# Patient Record
Sex: FEMALE | Race: BLACK OR AFRICAN AMERICAN | Employment: FULL TIME | URBAN - METROPOLITAN AREA
[De-identification: names, ages, dates, MRNs, and addresses within clinical notes are randomized per-mention and may not be internally consistent; named-entity substitution may affect disease eponyms.]

---

## 2020-03-30 ENCOUNTER — OP HISTORICAL/CONVERTED ENCOUNTER (OUTPATIENT)
Dept: OTHER | Age: 45
End: 2020-03-30

## 2020-07-02 ENCOUNTER — HOSPITAL ENCOUNTER (OUTPATIENT)
Dept: PREADMISSION TESTING | Age: 45
Discharge: HOME | End: 2020-07-02
Payer: COMMERCIAL

## 2020-07-02 VITALS
BODY MASS INDEX: 27.97 KG/M2 | OXYGEN SATURATION: 99 % | TEMPERATURE: 98.8 F | DIASTOLIC BLOOD PRESSURE: 69 MMHG | HEIGHT: 66 IN | SYSTOLIC BLOOD PRESSURE: 121 MMHG | HEART RATE: 73 BPM | WEIGHT: 174 LBS | RESPIRATION RATE: 20 BRPM

## 2020-07-02 PROCEDURE — 86923 COMPATIBILITY TEST ELECTRIC: CPT

## 2020-07-02 PROCEDURE — 86900 BLOOD TYPING SEROLOGIC ABO: CPT

## 2020-07-02 PROCEDURE — 36415 COLL VENOUS BLD VENIPUNCTURE: CPT

## 2020-07-02 RX ORDER — NORETHINDRONE 5 MG/1
5 TABLET ORAL DAILY
COMMUNITY
End: 2020-07-10

## 2020-07-02 NOTE — PERIOP NOTES
[address], [address] [facility]   MAIN OR                                  [phone]   MAIN PRE OP                          [phone]                                                                                AMBULATORY PRE OP          [phone]  PRE-ADMISSION TESTING    [phone]   Surgery Date:  7/9/20       Is surgery arrival time given by surgeon? YES   If NO, [facility] you between 3 and 7pm the day before your surgery with your arrival time. (If your surgery is on a Monday, we will call you the Friday before.)    Call [phone] after 7pm Monday-Friday if you did not receive this call. INSTRUCTIONS BEFORE YOUR SURGERY   When You  Arrive Arrive at the [address] [facility] on the day of your surgery  Have your insurance card, photo ID, and any copayment (if needed)   Food   and   Drink NO food or drink after midnight the night before surgery    This means NO water, gum, mints, coffee, juice, etc.  No alcohol (beer, wine, liquor) 24 hours before and after surgery   Medications to   TAKE   Morning of Surgery MEDICATIONS TO TAKE THE MORNING OF SURGERY WITH A SIP OF WATER:    none   Medications  To  STOP      7 days before surgery  Non-Steroidal anti-inflammatory Drugs (NSAID's): for example, Ibuprofen (Advil, Motrin), Naproxen (Aleve)   Aspirin, if taking for pain    Herbal supplements, vitamins, and fish oil   Other:  (Pain medications not listed above, including Tylenol may be taken)   Blood  Thinners  If you take  Aspirin, Plavix, Coumadin, or any blood-thinning or anti-blood clot medicine, talk to the doctor who prescribed the medications for pre-operative instructions.    Bathing Clothing  Jewelry  Valuables      If you shower the morning of surgery, please do not apply anything to your skin (lotions, powders, deodorant, or makeup, especially mascara)   Follow Chlorhexidine [employer] Fusion body wash instructions provided to you during PAT appointment. Begin 3 days prior to surgery.  Do not shave or trim anywhere 24 hours before surgery   Wear your hair loose or down; no pony-tails, buns, or metal hair clips   Wear loose, comfortable, clean clothes   Wear glasses instead of contacts   Leave money, valuables, and jewelry, including body piercings, at home   Going Home - or Spending the Night  SAME-DAY SURGERY: You must have a responsible adult drive you home and stay with you 24 hours after surgery   ADMITS: If your doctor is keeping you in the hospital after surgery, leave personal belongings/luggage in your car until you have a hospital room number. Hospital discharge time is 12 noon  Drivers must be here before 12 noon unless you are told differently   Special Instructions It is now mandated that all surgical patients be tested for COVID-19 prior to surgery. Testing has to be exactly 4 days prior to surgery. Your COVID test date is 7/5/2020 between 8:00 am and 11:00 am.       COVID testing will be performed curbside at the [facility]  [place]. There will be signs leading you to the testing site. You will need to bring a photo ID with you to be swabbed. Patients are advised to self-quarantine at home after testing and prior to your surgery date. You will be notified if your results are positive.     What to watch for:   Coronavirus (COVID-19) affects different people in different ways   It also appears with a wide range of symptoms from mild to severe   Signs usually appear 2-14 days after exposure     If you develop any of the following, notify your doctor immediately:  o Fever  o Chills, with or without a shiver  o Muscle pain  o Headache  o Sore throat  o Dry cough  o New loss of taste or smell  o Tiredness      If you develop any of the following, call 911:  o Shortness of breath  o Difficulty breathing  o Chest pain  o New confusion  o Blueness of fingers and/or lips     Follow all instructions so your surgery wont be cancelled. Please, be on time. If a situation occurs and you are delayed the day of surgery, call  [phone]. If your physical condition changes (like a fever, cold, flu, etc.) call your surgeon. Home medication(s) reviewed and verified via   bottles  during PAT appointment. The patient was contacted by     IN-PERSON  The patient verbalizes understanding of all instructions and     DOES NOT   need reinforcement.

## 2020-07-05 ENCOUNTER — HOSPITAL ENCOUNTER (OUTPATIENT)
Dept: PREADMISSION TESTING | Age: 45
Discharge: HOME | End: 2020-07-05
Payer: COMMERCIAL

## 2020-07-05 PROCEDURE — 87635 SARS-COV-2 COVID-19 AMP PRB: CPT

## 2020-07-06 LAB — SARS-COV-2, COV2NT: NOT DETECTED

## 2020-07-08 ENCOUNTER — ANESTHESIA EVENT (OUTPATIENT)
Dept: SURGERY | Age: 45
End: 2020-07-08
Payer: COMMERCIAL

## 2020-07-08 RX ORDER — SODIUM CHLORIDE 9 MG/ML
250 INJECTION, SOLUTION INTRAVENOUS AS NEEDED
Status: ACTIVE | OUTPATIENT
Start: 2020-07-08

## 2020-07-08 NOTE — PERIOP NOTES
Surgical orders received and placed on the paper chart. Labs and an EKG will need to be obtained on the day of surgery. Dr. Vega Pac has ordered for [employer] for 2 units of PC's.   Placed an order in [address] [place] [address] and called the blood bank requesting that the T&S obtained while the patient was in PAT be changed to a T&C.  DOS: 7/9/2020

## 2020-07-09 ENCOUNTER — HOSPITAL ENCOUNTER (OUTPATIENT)
Age: 45
Setting detail: OBSERVATION
Discharge: HOME | End: 2020-07-10
Attending: OBSTETRICS & GYNECOLOGY | Admitting: OBSTETRICS & GYNECOLOGY
Payer: COMMERCIAL

## 2020-07-09 ENCOUNTER — ANESTHESIA (OUTPATIENT)
Dept: SURGERY | Age: 45
End: 2020-07-09
Payer: COMMERCIAL

## 2020-07-09 DIAGNOSIS — Z90.710 STATUS POST LAPAROSCOPIC HYSTERECTOMY: Primary | ICD-10-CM

## 2020-07-09 LAB
BASOPHILS # BLD: 0.1 K/UL (ref 0–0.1)
BASOPHILS NFR BLD: 1 % (ref 0–1)
DIFFERENTIAL METHOD BLD: ABNORMAL
EOSINOPHIL # BLD: 0.2 K/UL (ref 0–0.4)
EOSINOPHIL NFR BLD: 2 % (ref 0–7)
ERYTHROCYTE [DISTWIDTH] IN BLOOD BY AUTOMATED COUNT: 18.8 % (ref 11.5–14.5)
HCG UR QL: NEGATIVE
HCT VFR BLD AUTO: 38.3 % (ref 35–47)
HGB BLD-MCNC: 11.5 G/DL (ref 11.5–16)
IMM GRANULOCYTES # BLD AUTO: 0 K/UL (ref 0–0.04)
IMM GRANULOCYTES NFR BLD AUTO: 0 % (ref 0–0.5)
LYMPHOCYTES # BLD: 2.4 K/UL (ref 0.8–3.5)
LYMPHOCYTES NFR BLD: 33 % (ref 12–49)
MCH RBC QN AUTO: 23.4 PG (ref 26–34)
MCHC RBC AUTO-ENTMCNC: 30 G/DL (ref 30–36.5)
MCV RBC AUTO: 78 FL (ref 80–99)
MONOCYTES # BLD: 0.6 K/UL (ref 0–1)
MONOCYTES NFR BLD: 9 % (ref 5–13)
NEUTS SEG # BLD: 4.1 K/UL (ref 1.8–8)
NEUTS SEG NFR BLD: 56 % (ref 32–75)
NRBC # BLD: 0 K/UL (ref 0–0.01)
NRBC BLD-RTO: 0 PER 100 WBC
PLATELET # BLD AUTO: 309 K/UL (ref 150–400)
PMV BLD AUTO: 11.7 FL (ref 8.9–12.9)
RBC # BLD AUTO: 4.91 M/UL (ref 3.8–5.2)
WBC # BLD AUTO: 7.3 K/UL (ref 3.6–11)

## 2020-07-09 PROCEDURE — 77030005513 HC CATH URETH FOL11 MDII -B: Performed by: OBSTETRICS & GYNECOLOGY

## 2020-07-09 PROCEDURE — 77030018778 HC MANIP UTER VCAR CNMD -B: Performed by: OBSTETRICS & GYNECOLOGY

## 2020-07-09 PROCEDURE — 85025 COMPLETE CBC W/AUTO DIFF WBC: CPT

## 2020-07-09 PROCEDURE — 36415 COLL VENOUS BLD VENIPUNCTURE: CPT

## 2020-07-09 PROCEDURE — 76030000003 HC AMB SURG OR TIME 1.5 TO 2: Performed by: OBSTETRICS & GYNECOLOGY

## 2020-07-09 PROCEDURE — 88307 TISSUE EXAM BY PATHOLOGIST: CPT

## 2020-07-09 PROCEDURE — 77030011640 HC PAD GRND REM COVD -A: Performed by: OBSTETRICS & GYNECOLOGY

## 2020-07-09 PROCEDURE — C1765 ADHESION BARRIER: HCPCS | Performed by: OBSTETRICS & GYNECOLOGY

## 2020-07-09 PROCEDURE — 77030034154 HC SHR COAG HARM ACE J&J -F: Performed by: OBSTETRICS & GYNECOLOGY

## 2020-07-09 PROCEDURE — 77030027138 HC INCENT SPIROMETER -A

## 2020-07-09 PROCEDURE — 74011250636 HC RX REV CODE- 250/636: Performed by: ANESTHESIOLOGY

## 2020-07-09 PROCEDURE — 77030020829: Performed by: OBSTETRICS & GYNECOLOGY

## 2020-07-09 PROCEDURE — 77030037032 HC INSRT SCIS CLICKLLINE DISP STOR -B: Performed by: OBSTETRICS & GYNECOLOGY

## 2020-07-09 PROCEDURE — 77030008756 HC TU IRR SUC STRY -B: Performed by: OBSTETRICS & GYNECOLOGY

## 2020-07-09 PROCEDURE — 77030010507 HC ADH SKN DERMBND J&J -B: Performed by: OBSTETRICS & GYNECOLOGY

## 2020-07-09 PROCEDURE — 74011000250 HC RX REV CODE- 250: Performed by: OBSTETRICS & GYNECOLOGY

## 2020-07-09 PROCEDURE — 77030020053 HC ELECTRD LAPSCP COVD -B: Performed by: OBSTETRICS & GYNECOLOGY

## 2020-07-09 PROCEDURE — 99218 HC RM OBSERVATION: CPT

## 2020-07-09 PROCEDURE — 77030018836 HC SOL IRR NACL ICUM -A: Performed by: OBSTETRICS & GYNECOLOGY

## 2020-07-09 PROCEDURE — 76060000063 HC AMB SURG ANES 1.5 TO 2 HR: Performed by: OBSTETRICS & GYNECOLOGY

## 2020-07-09 PROCEDURE — 77030009848 HC PASSR SUT SET COOP -C: Performed by: OBSTETRICS & GYNECOLOGY

## 2020-07-09 PROCEDURE — 77030035029 HC NDL INSUF VERES DISP COVD -B: Performed by: OBSTETRICS & GYNECOLOGY

## 2020-07-09 PROCEDURE — 76210000037 HC AMBSU PH I REC 2 TO 2.5 HR: Performed by: OBSTETRICS & GYNECOLOGY

## 2020-07-09 PROCEDURE — 74011250636 HC RX REV CODE- 250/636: Performed by: OBSTETRICS & GYNECOLOGY

## 2020-07-09 PROCEDURE — 77030040361 HC SLV COMPR DVT MDII -B

## 2020-07-09 PROCEDURE — 77030026438 HC STYL ET INTUB CARD -A: Performed by: ANESTHESIOLOGY

## 2020-07-09 PROCEDURE — 77030012770 HC TRCR OPT FX AMR -B: Performed by: OBSTETRICS & GYNECOLOGY

## 2020-07-09 PROCEDURE — 77030008684 HC TU ET CUF COVD -B: Performed by: ANESTHESIOLOGY

## 2020-07-09 PROCEDURE — 77030031139 HC SUT VCRL2 J&J -A: Performed by: OBSTETRICS & GYNECOLOGY

## 2020-07-09 PROCEDURE — 74011000250 HC RX REV CODE- 250: Performed by: NURSE ANESTHETIST, CERTIFIED REGISTERED

## 2020-07-09 PROCEDURE — 74011250637 HC RX REV CODE- 250/637: Performed by: OBSTETRICS & GYNECOLOGY

## 2020-07-09 PROCEDURE — 77030019908 HC STETH ESOPH SIMS -A: Performed by: ANESTHESIOLOGY

## 2020-07-09 PROCEDURE — 77030040922 HC BLNKT HYPOTHRM STRY -A

## 2020-07-09 PROCEDURE — 77030034628 HC LIGASURE LAP SEAL DIV MD COVD -F: Performed by: OBSTETRICS & GYNECOLOGY

## 2020-07-09 PROCEDURE — 74011000258 HC RX REV CODE- 258: Performed by: OBSTETRICS & GYNECOLOGY

## 2020-07-09 PROCEDURE — 77030020263 HC SOL INJ SOD CL0.9% LFCR 1000ML: Performed by: OBSTETRICS & GYNECOLOGY

## 2020-07-09 PROCEDURE — 74011250636 HC RX REV CODE- 250/636: Performed by: NURSE ANESTHETIST, CERTIFIED REGISTERED

## 2020-07-09 PROCEDURE — 77030003029 HC SUT VCRL J&J -B: Performed by: OBSTETRICS & GYNECOLOGY

## 2020-07-09 PROCEDURE — 81025 URINE PREGNANCY TEST: CPT

## 2020-07-09 PROCEDURE — 77030002933 HC SUT MCRYL J&J -A: Performed by: OBSTETRICS & GYNECOLOGY

## 2020-07-09 RX ORDER — DIPHENHYDRAMINE HCL 25 MG
25 CAPSULE ORAL
Status: DISCONTINUED | OUTPATIENT
Start: 2020-07-09 | End: 2020-07-10 | Stop reason: HOSPADM

## 2020-07-09 RX ORDER — SODIUM CHLORIDE 0.9 % (FLUSH) 0.9 %
5-40 SYRINGE (ML) INJECTION AS NEEDED
Status: DISCONTINUED | OUTPATIENT
Start: 2020-07-09 | End: 2020-07-10 | Stop reason: HOSPADM

## 2020-07-09 RX ORDER — PROPOFOL 10 MG/ML
INJECTION, EMULSION INTRAVENOUS AS NEEDED
Status: DISCONTINUED | OUTPATIENT
Start: 2020-07-09 | End: 2020-07-09 | Stop reason: HOSPADM

## 2020-07-09 RX ORDER — BUPIVACAINE HYDROCHLORIDE AND EPINEPHRINE 5; 5 MG/ML; UG/ML
INJECTION, SOLUTION EPIDURAL; INTRACAUDAL; PERINEURAL AS NEEDED
Status: DISCONTINUED | OUTPATIENT
Start: 2020-07-09 | End: 2020-07-09 | Stop reason: HOSPADM

## 2020-07-09 RX ORDER — SUCCINYLCHOLINE CHLORIDE 20 MG/ML
INJECTION INTRAMUSCULAR; INTRAVENOUS AS NEEDED
Status: DISCONTINUED | OUTPATIENT
Start: 2020-07-09 | End: 2020-07-09 | Stop reason: HOSPADM

## 2020-07-09 RX ORDER — SODIUM CHLORIDE, SODIUM LACTATE, POTASSIUM CHLORIDE, CALCIUM CHLORIDE 600; 310; 30; 20 MG/100ML; MG/100ML; MG/100ML; MG/100ML
100 INJECTION, SOLUTION INTRAVENOUS CONTINUOUS
Status: DISCONTINUED | OUTPATIENT
Start: 2020-07-09 | End: 2020-07-09

## 2020-07-09 RX ORDER — SODIUM CHLORIDE, SODIUM LACTATE, POTASSIUM CHLORIDE, CALCIUM CHLORIDE 600; 310; 30; 20 MG/100ML; MG/100ML; MG/100ML; MG/100ML
INJECTION, SOLUTION INTRAVENOUS
Status: DISCONTINUED | OUTPATIENT
Start: 2020-07-09 | End: 2020-07-09 | Stop reason: HOSPADM

## 2020-07-09 RX ORDER — NEOSTIGMINE METHYLSULFATE 1 MG/ML
INJECTION, SOLUTION INTRAVENOUS AS NEEDED
Status: DISCONTINUED | OUTPATIENT
Start: 2020-07-09 | End: 2020-07-09 | Stop reason: HOSPADM

## 2020-07-09 RX ORDER — ROCURONIUM BROMIDE 10 MG/ML
INJECTION, SOLUTION INTRAVENOUS AS NEEDED
Status: DISCONTINUED | OUTPATIENT
Start: 2020-07-09 | End: 2020-07-09 | Stop reason: HOSPADM

## 2020-07-09 RX ORDER — OXYCODONE AND ACETAMINOPHEN 5; 325 MG/1; MG/1
1-2 TABLET ORAL
Status: DISCONTINUED | OUTPATIENT
Start: 2020-07-09 | End: 2020-07-10 | Stop reason: HOSPADM

## 2020-07-09 RX ORDER — LIDOCAINE HYDROCHLORIDE 10 MG/ML
0.1 INJECTION, SOLUTION EPIDURAL; INFILTRATION; INTRACAUDAL; PERINEURAL AS NEEDED
Status: DISCONTINUED | OUTPATIENT
Start: 2020-07-09 | End: 2020-07-09

## 2020-07-09 RX ORDER — METRONIDAZOLE 500 MG/100ML
500 INJECTION, SOLUTION INTRAVENOUS ONCE
Status: COMPLETED | OUTPATIENT
Start: 2020-07-09 | End: 2020-07-09

## 2020-07-09 RX ORDER — KETOROLAC TROMETHAMINE 30 MG/ML
30 INJECTION, SOLUTION INTRAMUSCULAR; INTRAVENOUS
Status: DISCONTINUED | OUTPATIENT
Start: 2020-07-09 | End: 2020-07-10 | Stop reason: HOSPADM

## 2020-07-09 RX ORDER — HYDROMORPHONE HYDROCHLORIDE 1 MG/ML
1 INJECTION, SOLUTION INTRAMUSCULAR; INTRAVENOUS; SUBCUTANEOUS
Status: DISCONTINUED | OUTPATIENT
Start: 2020-07-09 | End: 2020-07-10 | Stop reason: HOSPADM

## 2020-07-09 RX ORDER — ONDANSETRON 2 MG/ML
4 INJECTION INTRAMUSCULAR; INTRAVENOUS
Status: DISCONTINUED | OUTPATIENT
Start: 2020-07-09 | End: 2020-07-10 | Stop reason: HOSPADM

## 2020-07-09 RX ORDER — HYDROMORPHONE HYDROCHLORIDE 2 MG/ML
INJECTION, SOLUTION INTRAMUSCULAR; INTRAVENOUS; SUBCUTANEOUS AS NEEDED
Status: DISCONTINUED | OUTPATIENT
Start: 2020-07-09 | End: 2020-07-09 | Stop reason: HOSPADM

## 2020-07-09 RX ORDER — MIDAZOLAM HYDROCHLORIDE 1 MG/ML
INJECTION, SOLUTION INTRAMUSCULAR; INTRAVENOUS AS NEEDED
Status: DISCONTINUED | OUTPATIENT
Start: 2020-07-09 | End: 2020-07-09 | Stop reason: HOSPADM

## 2020-07-09 RX ORDER — LIDOCAINE HYDROCHLORIDE 20 MG/ML
INJECTION, SOLUTION EPIDURAL; INFILTRATION; INTRACAUDAL; PERINEURAL AS NEEDED
Status: DISCONTINUED | OUTPATIENT
Start: 2020-07-09 | End: 2020-07-09 | Stop reason: HOSPADM

## 2020-07-09 RX ORDER — IBUPROFEN 600 MG/1
600 TABLET ORAL
Status: DISCONTINUED | OUTPATIENT
Start: 2020-07-10 | End: 2020-07-10 | Stop reason: HOSPADM

## 2020-07-09 RX ORDER — GLYCOPYRROLATE 0.2 MG/ML
INJECTION INTRAMUSCULAR; INTRAVENOUS AS NEEDED
Status: DISCONTINUED | OUTPATIENT
Start: 2020-07-09 | End: 2020-07-09 | Stop reason: HOSPADM

## 2020-07-09 RX ORDER — SODIUM CHLORIDE 0.9 % (FLUSH) 0.9 %
5-40 SYRINGE (ML) INJECTION EVERY 8 HOURS
Status: DISCONTINUED | OUTPATIENT
Start: 2020-07-09 | End: 2020-07-10 | Stop reason: HOSPADM

## 2020-07-09 RX ORDER — SODIUM CHLORIDE, SODIUM LACTATE, POTASSIUM CHLORIDE, CALCIUM CHLORIDE 600; 310; 30; 20 MG/100ML; MG/100ML; MG/100ML; MG/100ML
100 INJECTION, SOLUTION INTRAVENOUS CONTINUOUS
Status: DISCONTINUED | OUTPATIENT
Start: 2020-07-09 | End: 2020-07-09 | Stop reason: HOSPADM

## 2020-07-09 RX ORDER — HYDROMORPHONE HYDROCHLORIDE 1 MG/ML
.25-1 INJECTION, SOLUTION INTRAMUSCULAR; INTRAVENOUS; SUBCUTANEOUS
Status: DISCONTINUED | OUTPATIENT
Start: 2020-07-09 | End: 2020-07-09 | Stop reason: HOSPADM

## 2020-07-09 RX ORDER — DOCUSATE SODIUM 100 MG/1
100 CAPSULE, LIQUID FILLED ORAL 2 TIMES DAILY
Status: DISCONTINUED | OUTPATIENT
Start: 2020-07-09 | End: 2020-07-10 | Stop reason: HOSPADM

## 2020-07-09 RX ORDER — ONDANSETRON 2 MG/ML
INJECTION INTRAMUSCULAR; INTRAVENOUS AS NEEDED
Status: DISCONTINUED | OUTPATIENT
Start: 2020-07-09 | End: 2020-07-09 | Stop reason: HOSPADM

## 2020-07-09 RX ORDER — DEXAMETHASONE SODIUM PHOSPHATE 4 MG/ML
INJECTION, SOLUTION INTRA-ARTICULAR; INTRALESIONAL; INTRAMUSCULAR; INTRAVENOUS; SOFT TISSUE AS NEEDED
Status: DISCONTINUED | OUTPATIENT
Start: 2020-07-09 | End: 2020-07-09 | Stop reason: HOSPADM

## 2020-07-09 RX ORDER — VECURONIUM BROMIDE FOR INJECTION 1 MG/ML
INJECTION, POWDER, LYOPHILIZED, FOR SOLUTION INTRAVENOUS AS NEEDED
Status: DISCONTINUED | OUTPATIENT
Start: 2020-07-09 | End: 2020-07-09 | Stop reason: HOSPADM

## 2020-07-09 RX ORDER — NALOXONE HYDROCHLORIDE 0.4 MG/ML
0.4 INJECTION, SOLUTION INTRAMUSCULAR; INTRAVENOUS; SUBCUTANEOUS AS NEEDED
Status: DISCONTINUED | OUTPATIENT
Start: 2020-07-09 | End: 2020-07-10 | Stop reason: HOSPADM

## 2020-07-09 RX ORDER — DEXTROSE MONOHYDRATE AND SODIUM CHLORIDE 5; .9 G/100ML; G/100ML
125 INJECTION, SOLUTION INTRAVENOUS CONTINUOUS
Status: DISCONTINUED | OUTPATIENT
Start: 2020-07-09 | End: 2020-07-10 | Stop reason: HOSPADM

## 2020-07-09 RX ADMIN — GLYCOPYRROLATE 0.4 MG: 0.2 INJECTION, SOLUTION INTRAMUSCULAR; INTRAVENOUS at 12:40

## 2020-07-09 RX ADMIN — VECURONIUM BROMIDE 2 MG: 10 INJECTION, POWDER, LYOPHILIZED, FOR SOLUTION INTRAVENOUS at 12:27

## 2020-07-09 RX ADMIN — OXYCODONE HYDROCHLORIDE AND ACETAMINOPHEN 1 TABLET: 5; 325 TABLET ORAL at 19:59

## 2020-07-09 RX ADMIN — SUCCINYLCHOLINE CHLORIDE 100 MG: 20 INJECTION, SOLUTION INTRAMUSCULAR; INTRAVENOUS; PARENTERAL at 11:04

## 2020-07-09 RX ADMIN — Medication 3 MG: at 12:40

## 2020-07-09 RX ADMIN — DEXTROSE MONOHYDRATE AND SODIUM CHLORIDE 125 ML/HR: 5; .9 INJECTION, SOLUTION INTRAVENOUS at 14:17

## 2020-07-09 RX ADMIN — ONDANSETRON HYDROCHLORIDE 4 MG: 2 SOLUTION INTRAMUSCULAR; INTRAVENOUS at 11:19

## 2020-07-09 RX ADMIN — VECURONIUM BROMIDE 3 MG: 10 INJECTION, POWDER, LYOPHILIZED, FOR SOLUTION INTRAVENOUS at 11:26

## 2020-07-09 RX ADMIN — SODIUM CHLORIDE, SODIUM LACTATE, POTASSIUM CHLORIDE, AND CALCIUM CHLORIDE 100 ML/HR: 600; 310; 30; 20 INJECTION, SOLUTION INTRAVENOUS at 08:54

## 2020-07-09 RX ADMIN — HYDROMORPHONE HYDROCHLORIDE 1 MG: 2 INJECTION INTRAMUSCULAR; INTRAVENOUS; SUBCUTANEOUS at 12:54

## 2020-07-09 RX ADMIN — HYDROMORPHONE HYDROCHLORIDE 0.5 MG: 1 INJECTION, SOLUTION INTRAMUSCULAR; INTRAVENOUS; SUBCUTANEOUS at 13:32

## 2020-07-09 RX ADMIN — PROPOFOL 150 MG: 10 INJECTION, EMULSION INTRAVENOUS at 11:04

## 2020-07-09 RX ADMIN — ROCURONIUM BROMIDE 20 MG: 10 INJECTION, SOLUTION INTRAVENOUS at 11:04

## 2020-07-09 RX ADMIN — DOCUSATE SODIUM 100 MG: 100 CAPSULE, LIQUID FILLED ORAL at 17:54

## 2020-07-09 RX ADMIN — HYDROMORPHONE HYDROCHLORIDE 1 MG: 2 INJECTION INTRAMUSCULAR; INTRAVENOUS; SUBCUTANEOUS at 10:56

## 2020-07-09 RX ADMIN — SODIUM CHLORIDE, POTASSIUM CHLORIDE, SODIUM LACTATE AND CALCIUM CHLORIDE: 600; 310; 30; 20 INJECTION, SOLUTION INTRAVENOUS at 11:07

## 2020-07-09 RX ADMIN — DEXAMETHASONE SODIUM PHOSPHATE 8 MG: 4 INJECTION, SOLUTION INTRAMUSCULAR; INTRAVENOUS at 11:19

## 2020-07-09 RX ADMIN — CEFAZOLIN SODIUM 2 G: 1 POWDER, FOR SOLUTION INTRAMUSCULAR; INTRAVENOUS at 11:13

## 2020-07-09 RX ADMIN — DEXTROSE MONOHYDRATE AND SODIUM CHLORIDE 125 ML/HR: 5; .9 INJECTION, SOLUTION INTRAVENOUS at 20:01

## 2020-07-09 RX ADMIN — SUGAMMADEX 200 MG: 100 INJECTION, SOLUTION INTRAVENOUS at 12:49

## 2020-07-09 RX ADMIN — KETOROLAC TROMETHAMINE 30 MG: 30 INJECTION, SOLUTION INTRAMUSCULAR at 16:22

## 2020-07-09 RX ADMIN — Medication 10 ML: at 16:22

## 2020-07-09 RX ADMIN — MIDAZOLAM HYDROCHLORIDE 2 MG: 2 INJECTION, SOLUTION INTRAMUSCULAR; INTRAVENOUS at 10:56

## 2020-07-09 RX ADMIN — METRONIDAZOLE 500 MG: 500 SOLUTION INTRAVENOUS at 11:14

## 2020-07-09 RX ADMIN — LIDOCAINE HYDROCHLORIDE 60 MG: 20 INJECTION, SOLUTION INTRAVENOUS at 11:04

## 2020-07-09 NOTE — BRIEF OP NOTE
Brief Postoperative Note    Patient: Lucinda Santacruz  YOB: 1975  MRN: [id number]    Date of Procedure: 7/9/2020     Pre-Op Diagnosis: ABNORMAL UTERINE BLEEDING, FIBROIDS AND POLYPS    Post-Op Diagnosis: Same as preoperative diagnosis. Procedure(s):  TOTAL LAPAROSCOPIC HYSTERECTOMY, POSSIBLE OPEN, BILATERAL SALPINGECTOMY    Surgeon(s):  Alveda Claude, MD Rachelle Nodal, MD    Surgical Assistant: None    Anesthesia: General     Estimated Blood Loss (mL): 905RE     Complications: None    Specimens:   ID Type Source Tests Collected by Time Destination   1 : uterus,cervix,and bilateral fallopian tubes Preservative Uterus with Bilateral Fallopian Tubes  Alveda Claude, MD 7/9/2020 1231 Pathology        Implants: * No implants in log *    Drains: *Rivas *    Findings: Enlarged fibroid uterus, normal ovaries and tubes. Final specimen weight 427g.      Electronically Signed by Stacie Barnard MD on 7/9/2020 at 12:47 PM

## 2020-07-09 NOTE — ANESTHESIA POSTPROCEDURE EVALUATION
Procedure(s):  TOTAL LAPAROSCOPIC HYSTERECTOMY, POSSIBLE OPEN, BILATERAL SALPINGECTOMY. general    Anesthesia Post Evaluation      Multimodal analgesia: multimodal analgesia not used between 6 hours prior to anesthesia start to PACU discharge  Patient location during evaluation: PACU  Patient participation: complete - patient participated  Level of consciousness: awake  Pain management: adequate  Airway patency: patent  Anesthetic complications: no  Cardiovascular status: acceptable, blood pressure returned to baseline and hemodynamically stable  Respiratory status: acceptable  Hydration status: acceptable  Post anesthesia nausea and vomiting:  controlled      INITIAL Post-op Vital signs:   Vitals Value Taken Time   /62 7/9/2020  2:30 PM   Temp 36.7 °C (98.1 °F) 7/9/2020  2:00 PM   Pulse 69 7/9/2020  2:55 PM   Resp 17 7/9/2020  2:55 PM   SpO2 100 % 7/9/2020  2:55 PM   Vitals shown include unvalidated device data.

## 2020-07-09 NOTE — ANESTHESIA PREPROCEDURE EVALUATION
Relevant Problems   No relevant active problems       Anesthetic History   No history of anesthetic complications            Review of Systems / Medical History  Patient summary reviewed, nursing notes reviewed and pertinent labs reviewed    Pulmonary          Smoker         Neuro/Psych   Within defined limits           Cardiovascular  Within defined limits                Exercise tolerance: >4 METS     GI/Hepatic/Renal  Within defined limits              Endo/Other  Within defined limits           Other Findings              Physical Exam    Airway  Mallampati: III    Neck ROM: normal range of motion   Mouth opening: Normal     Cardiovascular  Regular rate and rhythm,  S1 and S2 normal,  no murmur, click, rub, or gallop  Rhythm: regular  Rate: normal         Dental  No notable dental hx       Pulmonary  Breath sounds clear to auscultation               Abdominal  GI exam deferred       Other Findings            Anesthetic Plan    ASA: 2  Anesthesia type: general          Induction: Intravenous  Anesthetic plan and risks discussed with: Patient

## 2020-07-09 NOTE — DISCHARGE INSTRUCTIONS
Hysterectomy Discharge Instructions    Patient ID:  Flip Manley  [id number]  53 y.o.  1975    Take Home Medications     What to do at Home    Recommended diet: AS TOLERATED                                    AVOID [address] PLENTY OF LIQUIDS    Recommended activity: GRADUALLY RESUME NORMAL ACTIVITIES OVER 4 WEEKS, LIMIT STAIRS AND LIFTING    NOTHING IN VAGINA FOR 6 WEEKS      Call your doctor if you experience any of the following symptoms. FEVER OR CHILLS  HEAVY VAGINAL DRAINAGE OR SMELLY DISCHARGE  REDNESS, BLEEDING OR DISCHARGE AT INCISION SITE  PAIN OR SWELLING IN YOU LEGS    Follow-up with Dr Kat Lira in 6 weeks. Hysterectomy: What to Expect at [address] can expect to feel better and stronger each day, although you may need pain medicine for a week or two. You may get tired easily or have less energy than usual. This may last for several weeks after surgery. You will probably notice that your belly is swollen and puffy. This is common. The swelling will take several weeks to go down. You may take 6 to 8 weeks to fully recover. It is important to avoid lifting while you are recovering so that you can heal.    This care sheet gives you a general idea about how long it will take for you to recover. But each person recovers at a different pace. Follow the steps below to get better as quickly as possible. How can you care for yourself at home? Activity  Rest when you feel tired. Getting enough sleep will help you recover. Try to walk each day. Start by walking a little more than you did the day before. Bit by bit, increase the amount you walk. Walking boosts blood flow and helps prevent pneumonia and constipation. Avoid lifting anything that would make you strain. This may include heavy grocery bags and milk containers, a heavy briefcase or backpack, cat litter or dog food bags, a child, or a vacuum .   Avoid strenuous activities, such as biking, jogging, weight lifting, or aerobic exercise, until your doctor says it is okay. You may shower. Pat the cut (incision) dry. Do not take a bath for the first 2 weeks, or until your doctor tells you it is okay. You may drive when you are no longer taking prescription pain medicine and can quickly move your foot from the gas pedal to the brake. You must also be able to sit comfortably for a long period of time, even if you do not plan to go far. You might get caught in traffic. You will probably need to take 2 to 4 weeks off from work. It depends on the type of work you do and how you feel. Your doctor will tell you when you can have sex again. Diet  You can eat your normal diet. If your stomach is upset, try bland, low-fat foods like plain rice, broiled chicken, toast, and yogurt. Drink plenty of fluids (unless your doctor tells you not to). You may notice that your bowel movements are not regular right after your surgery. This is common. Try to avoid constipation and straining with bowel movements. You may want to take a fiber supplement every day. If you have not had a bowel movement after a couple of days, ask your doctor about taking a mild laxative. Medicines  Take pain medicines exactly as directed. If the doctor gave you a prescription medicine for pain, take it as prescribed. If you are not taking a prescription pain medicine, take an over-the-counter medicine such as acetaminophen (Tylenol), ibuprofen (Advil, Motrin), or naproxen (Aleve). Read and follow all instructions on the label. Do not take two or more pain medicines at the same time unless the doctor told you to. Many pain medicines have acetaminophen, which is Tylenol. Too much Tylenol can be harmful. If your doctor prescribed antibiotics, take them as directed. Do not stop taking them just because you feel better. You need to take the full course of antibiotics.   If you think your pain medicine is making you sick to your stomach: Take your medicine after meals (unless your doctor has told you not to). Ask your doctor for a different pain medicine. Incision care  If you have strips of tape on the cut (incision) the doctor made, leave the tape on for a week and then remove them. Wash the area daily with warm, soapy water, and pat it dry. Other cleaning products, such as hydrogen peroxide, can make the wound heal more slowly. You may cover the area with a gauze bandage if it weeps or rubs against clothing. Change the bandage every day. Keep the area clean and dry. If necessary, you may dry the incision with a hair dryer on a cool setting after showering. Other instructions  You may have some light vaginal bleeding. Wear sanitary pads if needed. Do not douche or use tampons. Follow-up care is a key part of your treatment and safety. Be sure to make and go to all appointments, and call your doctor if you are having problems. It's also a good idea to know your test results and keep a list of the medicines you take. When should you call for help? Call 911 anytime you think you may need emergency care. For example, call if:  You pass out (lose consciousness). You have sudden chest pain and shortness of breath, or you cough up blood. You have severe pain in your belly. Call your doctor now or seek immediate medical care if:  You have bright red vaginal bleeding that soaks one or more pads in an hour, or you have large clots. You have foul-smelling discharge from your vagina. You are sick to your stomach or cannot keep fluids down. You have signs of infection, such as: Increased pain, swelling, warmth, or redness. Red streaks leading from the incision. Pus draining from the incision. Swollen lymph nodes in your neck, armpits, or groin. A fever. You have pain that does not get better after you take pain medicine. You have loose stitches, or your incision comes open.   You have signs of a blood clot, such as:  Pain in your calf, back of knee, thigh, or groin. Redness and swelling in your leg or groin. You have trouble passing urine or stool, especially if you have pain or swelling in your lower belly. You have hot flashes, sweating, flushing, or a fast or pounding heartbeat. Watch closely for changes in your health, and be sure to contact your doctor if:  You do not have a bowel movement after taking a laxative.

## 2020-07-09 NOTE — PERIOP NOTES
TRANSFER - OUT REPORT:    Verbal report given to ELIZABETH Marshall on Margarita Andrade  being transferred to  for routine post - op       Report consisted of patients Situation, Background, Assessment and   Recommendations(SBAR). Information from the following report(s) SBAR, OR Summary, Procedure Summary, Intake/Output, MAR and Cardiac Rhythm nsr was reviewed with the receiving nurse. Lines:   Peripheral IV 07/09/20 Right Hand (Active)   Site Assessment Clean, dry, & intact 07/09/20 1400   Phlebitis Assessment 0 07/09/20 1400   Infiltration Assessment 0 07/09/20 1400   Dressing Status Clean, dry, & intact; Occlusive 07/09/20 1400   Dressing Type Tape;Transparent 07/09/20 1400   Hub Color/Line Status Pink;Capped 07/09/20 1400   Alcohol Cap Used Yes 07/09/20 0838       Peripheral IV 07/09/20 Left Hand (Active)   Site Assessment Clean, dry, & intact 07/09/20 1400   Phlebitis Assessment 0 07/09/20 1400   Infiltration Assessment 0 07/09/20 1400   Dressing Status Clean, dry, & intact; Occlusive 07/09/20 1400   Dressing Type Tape;Transparent 07/09/20 1400   Hub Color/Line Status Pink; Infusing 07/09/20 1400        Opportunity for questions and clarification was provided.       Patient transported with:   O2 @ 2 liters  Registered Nurse

## 2020-07-10 VITALS
RESPIRATION RATE: 16 BRPM | TEMPERATURE: 98.2 F | DIASTOLIC BLOOD PRESSURE: 84 MMHG | BODY MASS INDEX: 27.99 KG/M2 | SYSTOLIC BLOOD PRESSURE: 137 MMHG | OXYGEN SATURATION: 97 % | WEIGHT: 174.16 LBS | HEART RATE: 82 BPM | HEIGHT: 66 IN

## 2020-07-10 LAB
ABO + RH BLD: NORMAL
ANION GAP SERPL CALC-SCNC: 7 MMOL/L (ref 5–15)
BLD PROD TYP BPU: NORMAL
BLD PROD TYP BPU: NORMAL
BLOOD GROUP ANTIBODIES SERPL: NORMAL
BPU ID: NORMAL
BPU ID: NORMAL
BUN SERPL-MCNC: 5 MG/DL (ref 6–20)
BUN/CREAT SERPL: 9 (ref 12–20)
CALCIUM SERPL-MCNC: 8.2 MG/DL (ref 8.5–10.1)
CHLORIDE SERPL-SCNC: 110 MMOL/L (ref 97–108)
CO2 SERPL-SCNC: 23 MMOL/L (ref 21–32)
CREAT SERPL-MCNC: 0.56 MG/DL (ref 0.55–1.02)
CROSSMATCH RESULT,%XM: NORMAL
CROSSMATCH RESULT,%XM: NORMAL
ERYTHROCYTE [DISTWIDTH] IN BLOOD BY AUTOMATED COUNT: 18.6 % (ref 11.5–14.5)
GLUCOSE SERPL-MCNC: 123 MG/DL (ref 65–100)
HCT VFR BLD AUTO: 33.9 % (ref 35–47)
HGB BLD-MCNC: 10.1 G/DL (ref 11.5–16)
MCH RBC QN AUTO: 23.4 PG (ref 26–34)
MCHC RBC AUTO-ENTMCNC: 29.8 G/DL (ref 30–36.5)
MCV RBC AUTO: 78.5 FL (ref 80–99)
NRBC # BLD: 0 K/UL (ref 0–0.01)
NRBC BLD-RTO: 0 PER 100 WBC
PLATELET # BLD AUTO: 265 K/UL (ref 150–400)
PMV BLD AUTO: 11.1 FL (ref 8.9–12.9)
POTASSIUM SERPL-SCNC: 3.8 MMOL/L (ref 3.5–5.1)
RBC # BLD AUTO: 4.32 M/UL (ref 3.8–5.2)
SODIUM SERPL-SCNC: 140 MMOL/L (ref 136–145)
SPECIMEN EXP DATE BLD: NORMAL
STATUS OF UNIT,%ST: NORMAL
STATUS OF UNIT,%ST: NORMAL
UNIT DIVISION, %UDIV: 0
UNIT DIVISION, %UDIV: 0
WBC # BLD AUTO: 14.7 K/UL (ref 3.6–11)

## 2020-07-10 PROCEDURE — 74011250637 HC RX REV CODE- 250/637: Performed by: OBSTETRICS & GYNECOLOGY

## 2020-07-10 PROCEDURE — 36415 COLL VENOUS BLD VENIPUNCTURE: CPT

## 2020-07-10 PROCEDURE — 85027 COMPLETE CBC AUTOMATED: CPT

## 2020-07-10 PROCEDURE — 99218 HC RM OBSERVATION: CPT

## 2020-07-10 PROCEDURE — 94760 N-INVAS EAR/PLS OXIMETRY 1: CPT

## 2020-07-10 PROCEDURE — 77010033678 HC OXYGEN DAILY

## 2020-07-10 PROCEDURE — 80048 BASIC METABOLIC PNL TOTAL CA: CPT

## 2020-07-10 RX ORDER — OXYCODONE AND ACETAMINOPHEN 5; 325 MG/1; MG/1
1-2 TABLET ORAL
Qty: 20 TAB | Refills: 0 | Status: SHIPPED | OUTPATIENT
Start: 2020-07-10 | End: 2020-07-15

## 2020-07-10 RX ADMIN — DOCUSATE SODIUM 100 MG: 100 CAPSULE, LIQUID FILLED ORAL at 08:44

## 2020-07-10 RX ADMIN — Medication 10 ML: at 05:48

## 2020-07-10 RX ADMIN — OXYCODONE HYDROCHLORIDE AND ACETAMINOPHEN 1 TABLET: 5; 325 TABLET ORAL at 08:44

## 2020-07-10 NOTE — PROGRESS NOTES
7/10/2020  9:38 AM  Reason for Admission: Elective - Abnormal uterine bleeding requiring surgery    Assessment:   [x]In person with pt   []Via p/c with pt   []With family member in person. Who/Relation:     []With family member via p/c. Who/Relation:   []Chart Review    RUR:  NA - OBS  Risk Level: [x]Low []Moderate []High  Value-based purchasing: [] Yes [x] No  Bundle patient: [] Yes [x] No   Specify:     Advance Directive: Full Code. No AD on file. Assessment:    Age: 53    Sex: [] Male [x]Female     Residency: [x]Private residence []Apartment []Assisted Living []LTC []Other:   Exterior Steps: 8  Interior Steps: 0     Lives With: [x]With spouse []Other family members []Underage children []Alone []Care provider []Other:    Prior functioning:  [x]Independent []Dependent []Partial dependence   Pt requires assistance with: []Bathing []Dressing []Toileting []Ambulation     Prior DME required:  [x]None []RW []Cane []Crutches []Bedside commode []CPAP []Home O2 (Liter/Provider: ) []Nebulizer   []Shower Chair []Wheelchair []Hospital Bed []Justine []Stair lift []Rollator []Other:    DME available: [x]None []RW []Cane []Crutches []Bedside commode []CPAP []Home O2 (Liter/Provider: ) []Nebulizer   []Shower Chair []Wheelchair []Hospital Bed []Justine []Stair lift []Rollator []Other:    Rehab history: [x]None []Outpatient PT []Home Health (Provider/Date: ) []SNF (Provider/Date: ) []IPR (Provider/Date: ) []LTC (Provider/Date: )    Discharge Concerns: []Yes [x]No []Unknown   Describe: Insurer: [facility]  Observation notice provided in writing to patient and/or caregiver as well as verbal explanation of the policy. Patients who are in outpatient status also receive the Observation notice. PCP: None. Pt uses [facility] as needed.     Name of Practice:   Current patient: []Yes []No   Approximate date of last visit:   Access to virtual PCP visits: []Yes []No    Pharmacy: [facility]  Observation notice provided in writing to patient and/or caregiver as well as verbal explanation of the policy. Patients who are in outpatient status also receive the Observation notice. DC Transport: Family        Transition of care plan:    []Unable to determine at this time. Awaiting clinical progress, and disposition recommendations. [x] Home with outpatient follow-up    [] Home with Outpatient PT and outpatient follow-up   Pt aware of OP appt? []Yes, Provider:   []Not scheduled   Transport provider:     [] Home with family assistance as needed and outpatient follow-up   Family able to assist:    Schedule:  Transport provider:      [] Home with Home Health   - Provider:     []SNF/IPR   -[]Preferences given:   []Listing provided and preferences requested   -Status: []Pending []Accepted:    -Auth required: []Yes []No    -Auth initiated date:   -3 midnight stay required: []Yes []No  Date satisfied:     [] Other:     Moe Gonzalez MA    Care Management Interventions  PCP Verified by CM: Yes(None. Pt primarily uses [facility] when needed. )  Mode of Transport at Discharge:  Other (see comment)(Family)  MyChart Signup: Yes  Discharge Durable Medical Equipment: Yes  Physical Therapy Consult: Yes  Occupational Therapy Consult: Yes  Speech Therapy Consult: Yes  Current Support Network: Own Home  Confirm Follow Up Transport: Family  Discharge Location  Discharge Placement: Home with family assistance

## 2020-07-10 NOTE — PROGRESS NOTES
2300 Bedside and Verbal shift change report given to Nolvia Coley RN (oncoming nurse) by Rin Murphy RN (offgoing nurse). Report included the following information SBAR and Kardex.

## 2020-07-10 NOTE — PROGRESS NOTES
Problem: Falls - Risk of  Goal: *Absence of Falls  Description: Document Peter Tesfaye Fall Risk and appropriate interventions in the flowsheet.   Outcome: Progressing Towards Goal  Note: Fall Risk Interventions:  Mobility Interventions: Communicate number of staff needed for ambulation/transfer, Patient to call before getting OOB, Utilize walker, cane, or other assistive device, Utilize gait belt for transfers/ambulation         Medication Interventions: Patient to call before getting OOB, Teach patient to arise slowly, Utilize gait belt for transfers/ambulation    Elimination Interventions: Call light in reach, Patient to call for help with toileting needs, Stay With Me (per policy)              Problem: Patient Education: Go to Patient Education Activity  Goal: Patient/Family Education  Outcome: Progressing Towards Goal     Problem: Patient Education: Go to Patient Education Activity  Goal: Patient/Family Education  Outcome: Progressing Towards Goal     Problem: Vaginal Hysterectomy: Day of Surgery  Goal: Activity/Safety  Outcome: Progressing Towards Goal  Goal: Consults, if ordered  Outcome: Progressing Towards Goal  Goal: Diagnostic Test/Procedures  Outcome: Progressing Towards Goal  Goal: Nutrition/Diet  Outcome: Progressing Towards Goal  Goal: Discharge Planning  Outcome: Progressing Towards Goal  Goal: Medications  Outcome: Progressing Towards Goal  Goal: Respiratory  Outcome: Progressing Towards Goal  Goal: Treatments/Interventions/Procedures  Outcome: Progressing Towards Goal  Goal: Psychosocial  Outcome: Progressing Towards Goal  Goal: *Hemodynamically stable  Outcome: Progressing Towards Goal  Goal: *Optimal pain control at patient's stated goal  Outcome: Progressing Towards Goal  Goal: *Absence of infection signs and symptoms  Outcome: Progressing Towards Goal     Problem: Vaginal Hysterectomy: Post-Op Day 1/Day of Discharge  Goal: Activity/Safety  Outcome: Progressing Towards Goal  Goal: Consults, if ordered  Outcome: Progressing Towards Goal  Goal: Diagnostic Test/Procedures  Outcome: Progressing Towards Goal  Goal: Nutrition/Diet  Outcome: Progressing Towards Goal  Goal: Discharge Planning  Outcome: Progressing Towards Goal  Goal: Medications  Outcome: Progressing Towards Goal  Goal: Respiratory  Outcome: Progressing Towards Goal  Goal: Treatments/Interventions/Procedures  Outcome: Progressing Towards Goal  Goal: Psychosocial  Outcome: Progressing Towards Goal     Problem: Vaginal Hysterectomy: Discharge Outcomes  Goal: *Demonstrates progressive activity  Outcome: Progressing Towards Goal  Goal: *Tolerating diet  Outcome: Progressing Towards Goal  Goal: *Hemodynamically stable  Outcome: Progressing Towards Goal  Goal: *Describes available resources and support systems  Outcome: Progressing Towards Goal  Goal: *Optimal pain control at patient's stated goal  Outcome: Progressing Towards Goal  Goal: *Verbalizes understanding and describes medication purposes and frequencies  Outcome: Progressing Towards Goal  Goal: *Lungs clear or at baseline  Outcome: Progressing Towards Goal  Goal: *Active bowel function  Outcome: Progressing Towards Goal  Goal: *Voiding  Outcome: Progressing Towards Goal  Goal: *Verbalizes and demonstrates incision care  Outcome: Progressing Towards Goal  Goal: *Expresses feelings about diagnosis and surgery  Outcome: Progressing Towards Goal  Goal: *Received and verbalizes understanding of discharge plan and instructions  Outcome: Progressing Towards Goal     Problem: Pain  Goal: *Control of Pain  Outcome: Progressing Towards Goal

## 2020-07-10 NOTE — PROGRESS NOTES
Problem: Falls - Risk of  Goal: *Absence of Falls  Description: Document Mary Ann Patches Fall Risk and appropriate interventions in the flowsheet.   7/10/2020 0940 by Deon Hogue RN  Outcome: Resolved/Met  Note: Fall Risk Interventions:  Mobility Interventions: Bed/chair exit alarm         Medication Interventions: Bed/chair exit alarm    Elimination Interventions: Call light in reach           7/10/2020 0940 by Deon Hogue RN  Outcome: Progressing Towards Goal  Note: Fall Risk Interventions:  Mobility Interventions: Bed/chair exit alarm         Medication Interventions: Bed/chair exit alarm    Elimination Interventions: Call light in reach              Problem: Patient Education: Go to Patient Education Activity  Goal: Patient/Family Education  7/10/2020 0940 by Deon Hogue RN  Outcome: Resolved/Met  7/10/2020 0940 by Deon Hogue RN  Outcome: Progressing Towards Goal     Problem: Patient Education: Go to Patient Education Activity  Goal: Patient/Family Education  7/10/2020 0940 by Deon Hogue RN  Outcome: Resolved/Met  7/10/2020 0940 by Deon Hogue RN  Outcome: Progressing Towards Goal     Problem: Vaginal Hysterectomy: Day of Surgery  Goal: Off Pathway (Use only if patient is Off Pathway)  7/10/2020 0940 by Deon Hogue RN  Outcome: Resolved/Met  7/10/2020 0940 by Deon Hogue RN  Outcome: Progressing Towards Goal  Goal: Activity/Safety  7/10/2020 0940 by Deon Hogue RN  Outcome: Resolved/Met  7/10/2020 0940 by Deon Hogue RN  Outcome: Progressing Towards Goal  Goal: Consults, if ordered  7/10/2020 0940 by Deon Hogue RN  Outcome: Resolved/Met  7/10/2020 0940 by Deon Hogue RN  Outcome: Progressing Towards Goal  Goal: Diagnostic Test/Procedures  7/10/2020 0940 by Deon Hogue RN  Outcome: Resolved/Met  7/10/2020 0940 by Deon Hogue RN  Outcome: Progressing Towards Goal  Goal: Nutrition/Diet  7/10/2020 0940 by Deon Hogue RN  Outcome: Resolved/Met  7/10/2020 0940 by Renard Barajas RN  Outcome: Progressing Towards Goal  Goal: Discharge Planning  7/10/2020 0940 by Renard Barajas RN  Outcome: Resolved/Met  7/10/2020 0940 by Renard Barajas RN  Outcome: Progressing Towards Goal  Goal: Medications  7/10/2020 0940 by Renard Barajas RN  Outcome: Resolved/Met  7/10/2020 0940 by Renard Barajas RN  Outcome: Progressing Towards Goal  Goal: Respiratory  7/10/2020 0940 by Renard Barajas RN  Outcome: Resolved/Met  7/10/2020 0940 by Renard Barajas RN  Outcome: Progressing Towards Goal  Goal: Treatments/Interventions/Procedures  7/10/2020 0940 by Renard Barajas RN  Outcome: Resolved/Met  7/10/2020 0940 by Renard Barajas RN  Outcome: Progressing Towards Goal  Goal: Psychosocial  7/10/2020 0940 by Renard Barajas RN  Outcome: Resolved/Met  7/10/2020 0940 by Renard Barajas RN  Outcome: Progressing Towards Goal  Goal: *Hemodynamically stable  7/10/2020 0940 by Renard Barajas RN  Outcome: Resolved/Met  7/10/2020 0940 by Renard Barajas RN  Outcome: Progressing Towards Goal  Goal: *Optimal pain control at patient's stated goal  7/10/2020 0940 by Renard Barajas RN  Outcome: Resolved/Met  7/10/2020 0940 by Renard Barajas RN  Outcome: Progressing Towards Goal  Goal: *Absence of infection signs and symptoms  7/10/2020 0940 by Renard Barajas RN  Outcome: Resolved/Met  7/10/2020 0940 by Renard Barajas RN  Outcome: Progressing Towards Goal     Problem: Vaginal Hysterectomy: Post-Op Day 1/Day of Discharge  Goal: Off Pathway (Use only if patient is Off Pathway)  7/10/2020 0940 by Renard Barajas RN  Outcome: Resolved/Met  7/10/2020 0940 by Renard Barajas RN  Outcome: Progressing Towards Goal  Goal: Activity/Safety  7/10/2020 0940 by Renard Barajas RN  Outcome: Resolved/Met  7/10/2020 0940 by Renard Barajas RN  Outcome: Progressing Towards Goal  Goal: Consults, if ordered  7/10/2020 0940 by Renard Barajas RN  Outcome: Resolved/Met  7/10/2020 0940 by Harvie Barajas, RN  Outcome: Progressing Towards Goal  Goal: Diagnostic Test/Procedures  7/10/2020 0940 by Astrid Maciel RN  Outcome: Resolved/Met  7/10/2020 0940 by Astrid Maciel RN  Outcome: Progressing Towards Goal  Goal: Nutrition/Diet  7/10/2020 0940 by Astrid Maciel, RN  Outcome: Resolved/Met  7/10/2020 0940 by Astrid Maciel, RN  Outcome: Progressing Towards Goal  Goal: Discharge Planning  7/10/2020 0940 by Astrid Maciel, RN  Outcome: Resolved/Met  7/10/2020 0940 by Astrid Maciel RN  Outcome: Progressing Towards Goal  Goal: Medications  7/10/2020 0940 by Astrid Maciel, RN  Outcome: Resolved/Met  7/10/2020 0940 by Astrid Maciel RN  Outcome: Progressing Towards Goal  Goal: Respiratory  7/10/2020 0940 by Astrid Maciel, RN  Outcome: Resolved/Met  7/10/2020 0940 by Astrid Maciel, RN  Outcome: Progressing Towards Goal  Goal: Treatments/Interventions/Procedures  7/10/2020 0940 by Astrid Maciel, RN  Outcome: Resolved/Met  7/10/2020 0940 by Astrid Maciel RN  Outcome: Progressing Towards Goal  Goal: Psychosocial  7/10/2020 0940 by Astrid Maciel, RN  Outcome: Resolved/Met  7/10/2020 0940 by Astrid Maciel RN  Outcome: Progressing Towards Goal     Problem: Vaginal Hysterectomy: Discharge Outcomes  Goal: *Demonstrates progressive activity  7/10/2020 0940 by Astrid Maciel, ELIZABETH  Outcome: Resolved/Met  7/10/2020 0940 by Astrid Maciel RN  Outcome: Progressing Towards Goal  Goal: *Tolerating diet  7/10/2020 0940 by Astrid Maciel RN  Outcome: Resolved/Met  7/10/2020 0940 by Astrid Maciel RN  Outcome: Progressing Towards Goal  Goal: *Hemodynamically stable  7/10/2020 0940 by Astrid Maciel, RN  Outcome: Resolved/Met  7/10/2020 0940 by Astrid Maciel RN  Outcome: Progressing Towards Goal  Goal: *Describes available resources and support systems  7/10/2020 0940 by Astrid Maciel, ELIZABETH  Outcome: Resolved/Met  7/10/2020 0940 by Arlis Brew, RN  Outcome: Progressing Towards Goal  Goal: *Optimal pain control at patient's stated goal  7/10/2020 0940 by Mehrdad Ford RN  Outcome: Resolved/Met  7/10/2020 0940 by Mehrdad Ford RN  Outcome: Progressing Towards Goal  Goal: *Verbalizes understanding and describes medication purposes and frequencies  7/10/2020 0940 by Mehrdad Ford RN  Outcome: Resolved/Met  7/10/2020 0940 by Mehrdad Ford RN  Outcome: Progressing Towards Goal  Goal: *Lungs clear or at baseline  7/10/2020 0940 by Mehrdad Ford RN  Outcome: Resolved/Met  7/10/2020 0940 by Mehrdad Ford RN  Outcome: Progressing Towards Goal  Goal: *Active bowel function  7/10/2020 0940 by Mehrdad Ford RN  Outcome: Resolved/Met  7/10/2020 0940 by Mehrdad Ford RN  Outcome: Progressing Towards Goal  Goal: *Voiding  7/10/2020 0940 by Mehrdad Ford RN  Outcome: Resolved/Met  7/10/2020 0940 by Mehrdad Ford RN  Outcome: Progressing Towards Goal  Goal: *Verbalizes and demonstrates incision care  7/10/2020 0940 by Mehrdad Ford RN  Outcome: Resolved/Met  7/10/2020 0940 by Mehrdad Ford RN  Outcome: Progressing Towards Goal  Goal: *Expresses feelings about diagnosis and surgery  7/10/2020 0940 by Mehrdad Ford RN  Outcome: Resolved/Met  7/10/2020 0940 by Mehrdad Ford RN  Outcome: Progressing Towards Goal  Goal: *Received and verbalizes understanding of discharge plan and instructions  7/10/2020 0940 by Mehrdad Ford RN  Outcome: Resolved/Met  7/10/2020 0940 by Mehrdad Ford RN  Outcome: Progressing Towards Goal     Problem: Pain  Goal: *Control of Pain  7/10/2020 0940 by Mehrdad Ford RN  Outcome: Resolved/Met  7/10/2020 0940 by Mehrdad Ford RN  Outcome: Progressing Towards Goal

## 2020-07-10 NOTE — PROGRESS NOTES
PostOp Note    Anne Marie Ann  [id number]  1975  53 y.o.    S:  Ms. Anne Marie Ann is a 53 y.o. POD#1 s/p TLH/BS for AUB, fibroids. She is doing well. Her pain is well controlled with PO medications. She has no nausea and has tolerated clears and crackers. UOP has been adequate. She denies ha/cp/n/v/sob. She has no vaginal bleeding. O:    Visit Vitals  /67 (BP 1 Location: Left arm, BP Patient Position: At rest)   Pulse 60   Temp 98.2 °F (36.8 °C)   Resp 16   Ht 5' 6\" (1.676 m)   Wt 79 kg (174 lb 2.6 oz)   SpO2 98%   Breastfeeding No   BMI 28.11 kg/m²        Lab Results   Component Value Date/Time    WBC 14.7 (H) 07/10/2020 05:12 AM    HGB 10.1 (L) 07/10/2020 05:12 AM    HCT 33.9 (L) 07/10/2020 05:12 AM    PLATELET 335 81/23/1074 05:12 AM    MCV 78.5 (L) 07/10/2020 05:12 AM     Lab Results   Component Value Date/Time    Sodium 140 07/10/2020 05:12 AM    Potassium 3.8 07/10/2020 05:12 AM    Chloride 110 (H) 07/10/2020 05:12 AM    CO2 23 07/10/2020 05:12 AM    Anion gap 7 07/10/2020 05:12 AM    Glucose 123 (H) 07/10/2020 05:12 AM    BUN 5 (L) 07/10/2020 05:12 AM    Creatinine 0.56 07/10/2020 05:12 AM    BUN/Creatinine ratio 9 (L) 07/10/2020 05:12 AM    GFR est AA >60 07/10/2020 05:12 AM    GFR est non-AA >60 07/10/2020 05:12 AM    Calcium 8.2 (L) 07/10/2020 05:12 AM     Date 07/09/20 0700 - 07/10/20 0659 07/10/20 0700 - 07/11/20 0659   Shift 1009-4640 8691-5604 24 Hour Total 7801-7273 3658-3262 24 Hour Total   INTAKE   P.O.  120 120        P. O.  120 120      I. V.(mL/kg/hr) 9779. 1(2.1) 0(0) 1952.1(1)        I.V. 400  400        Volume (lactated Ringers infusion) 650  650        Volume (lactated Ringers infusion) 750  750        Volume (dextrose 5% and 0.9% NaCl infusion) 152.1 0 152.1      Shift Total(mL/kg) 1952. 1(24.7) 120(1.5) 2072.1(26.2)      OUTPUT   Urine(mL/kg/hr) 450(0.5) 900(0.9) 1350(0.7)        Urine Output 300  300        Urine Output (mL) ([REMOVED] Urinary Catheter 07/09/20)  Blood 150  150        Estimated Blood Loss 150  150      Shift Total(mL/kg) 600(7.6) 900(11.4) 1500(19)      NET 1352.1 -780 572.1      Weight (kg) 79 79 79 79 79 79         Gen - No acute distress  CV - Regular rate & rhythm  Pulm - Clear to auscultation Bilaterally  Abd - +bowel sounds, soft, appropriately tender. No rebound/guarding incisions c/d/i   Extr - SCDs in place, warm and well perfused    A/P:  POD#1 s/p TLH/BS. Doing well   1. Routine PO care  2. IVF - decrease as tolerates PO  3. Pain control - Percocet/ibuprofen. Dilaudid IV prn  4. Labs - as expected   5. Rivas out, awaiting void - UOP adequate  6. SCDs until ambulatory  7. Continue to encourage IS. O2 Saturation stable. 8.  Anticipate D/C today.         Emili Mays MD  [facility]

## 2020-07-10 NOTE — DISCHARGE SUMMARY
Discharge Summary     Name: Erin Jesus MRN: [id number]  SSN: [id number]    YOB: 1975  Age: 53 y.o. Sex: female      Allergies: Patient has no known allergies. Admit Date: 7/9/2020    Discharge Date: 7/10/2020      Admitting Physician: Janett Soto MD     * Admission Diagnoses: Abnormal uterine bleeding, Acute Blood Loss Anemia and Fibroids    * Discharge Diagnoses:   Hospital Problems as of 7/10/2020 Never Reviewed          Codes Class Noted - Resolved POA    Status post laparoscopic hysterectomy ICD-10-CM: Z90.710  ICD-9-CM: V88.01  7/9/2020 - Present Unknown               * Procedures: Total Laparoscopic Hysterectomy with Bilateral Salpingectomy    * Discharge Condition: [facility] Course: Normal hospital course for this procedure. Significant Diagnostic Studies:   Recent Results (from the past 24 hour(s))   METABOLIC PANEL, BASIC    Collection Time: 07/10/20  5:12 AM   Result Value Ref Range    Sodium 140 136 - 145 mmol/L    Potassium 3.8 3.5 - 5.1 mmol/L    Chloride 110 (H) 97 - 108 mmol/L    CO2 23 21 - 32 mmol/L    Anion gap 7 5 - 15 mmol/L    Glucose 123 (H) 65 - 100 mg/dL    BUN 5 (L) 6 - 20 MG/DL    Creatinine 0.56 0.55 - 1.02 MG/DL    BUN/Creatinine ratio 9 (L) 12 - 20      GFR est AA >60 >60 ml/min/1.73m2    GFR est non-AA >60 >60 ml/min/1.73m2    Calcium 8.2 (L) 8.5 - 10.1 MG/DL   CBC W/O DIFF    Collection Time: 07/10/20  5:12 AM   Result Value Ref Range    WBC 14.7 (H) 3.6 - 11.0 K/uL    RBC 4.32 3.80 - 5.20 M/uL    HGB 10.1 (L) 11.5 - 16.0 g/dL    HCT 33.9 (L) 35.0 - 47.0 %    MCV 78.5 (L) 80.0 - 99.0 FL    MCH 23.4 (L) 26.0 - 34.0 PG    MCHC 29.8 (L) 30.0 - 36.5 g/dL    RDW 18.6 (H) 11.5 - 14.5 %    PLATELET 890 229 - 048 K/uL    MPV 11.1 8.9 - 12.9 FL    NRBC 0.0 0  WBC    ABSOLUTE NRBC 0.00 0.00 - 0.01 K/uL       * Disposition: Home    Discharge Medications:   Current Discharge Medication List           * Follow-up Care/Patient Instructions:   Activity: No sex, douching, or tampons for 6 weeks or as directed by your physician. No heavy lifting for 6 weeks. No driving while taking pain medication.   Diet: Resume pre-hospital diet  Wound Care: Keep wound clean and dry, Ice to area for comfort and As directed    Follow-up Information    None

## 2020-07-10 NOTE — OP NOTES
[facility] 77  OPERATIVE REPORT    Name:  Nanci Hurst  MR#:  [id number]  :  1975  ACCOUNT #:  [de-identified]  DATE OF SERVICE:  2020    PREOPERATIVE DIAGNOSES:  1. Abnormal uterine bleeding. 2.  Enlarged uterine fibroids. 3.  Uterine polyps. POSTOPERATIVE DIAGNOSES:  1. Abnormal uterine bleeding. 2.  Enlarged uterine fibroids. 3.  Uterine polyps. PROCEDURE PERFORMED:  Total laparoscopic hysterectomy and bilateral salpingectomy. SURGEON:  Rina Gutiérrez MD    ASSISTANT SURGEON:  Mary Beth Salomon MD    ANESTHESIA:  General.    COMPLICATIONS:  None. SPECIMENS REMOVED:  Pathology:  Uterus, cervix, and bilateral fallopian tubes weighing 427 g. IMPLANTS:  None. ESTIMATED BLOOD LOSS:  150 mL. IV FLUIDS:  1400 mL. URINE OUTPUT:  300 mL of clear urine at the end of the case. BLOOD PRODUCTS:  None. DRAINS:  Rivas to gravity. DISPOSITION:  To the PACU in stable condition. FINDINGS AT THE TIME OF PROCEDURE:  1. An enlarged fibroid uterus. 2.  Normal tubes and ovaries bilaterally. 3.  Hemostasis. 4.  Normal peristalsing ureters. PROCEDURE NOTE:  After informed consent, the patient was taken to the operating room where general anesthesia was found to be adequate. She was then prepped and draped in the normal sterile fashion in dorsal supine low lithotomy position using Yellofins stirrups. Her arms were tucked to her side and care was taken to avoid hyperflexion of pressure points. A formal timeout was performed. A speculum was inserted into the vagina and the anterior lip of the cervix was grasped with a single-tooth tenaculum and a uterine VCare manipulator was then placed and the tenaculum was removed. The speculum was removed and the Rivas catheter was placed. After a glove change, attention was then turned to the abdomen where a 5-mm infraumbilical skin incision was made with the scalpel.   Using a 5-mm Optiview port, the abdomen was entered under direct visualization. Once intraabdominally, the abdomen was insufflated and the patient was placed on Trendelenburg position and after injection of 0.25% Marcaine with epinephrine, bilateral lower quadrant ports were placed, 5-mm ports on each side under direct visualization. The uterus was quite bulky with fundal fibroids; however, visualization was able to be attained circumferentially. The decision was made to proceed laparoscopically. Using the LigaSure device, the bilateral fallopian tubes were excised with the LigaSure and removed from the port sites. The bilateral utero-ovarian ligaments were triply cauterized and transected. Further bites were taken to the level of the round ligament, which was cauterized and transected. Further bites again with the LigaSure were taken to the level of the internal os and the bilateral uterine arteries were cauterized. Once both sites were cauterized, they were transected. Further straight bites were taken to the level of the internal os and the bladder flap was created and pushed away nicely. The ureters were bilaterally peristalsing and well out of the way of the operative field. Once the level of the [facility] manipulator was reached, the Bovie J-hook was  used to create the colpotomy circumferentially following the green VCare manipulator ring. Care was taken to avoid any bowel injury and the bowels were visualized circumferentially. Once the uterus was amputated from the vagina, our attention was then turned to the vagina where a speculum was placed and the cervix was grasped with Allis clamps and the uterus and cervix were removed. The vaginal cuff was then irrigated and closed with 0 Vicryl from an anterior to posterior fashion. This was hemostatic. After a glove change, attention was turned back to the abdomen where all pedicles were inspected and found to be hemostatic. The trocars were removed under direct visualization.   The trocar sites were removed once the abdomen was desufflated. The port sites were then closed with a 4-0 Monocryl in a mattress fashion and Dermabond. Sponge, lap, and needle counts were correct at the end of case x2. She tolerated the procedure excellently and was taken to recovery room in stable condition.       Wilbert Frazier MD      [id number]  D:  07/09/2020 13:01  T:  07/10/2020 0:06  JOB #:  [id number]

## 2021-08-24 ENCOUNTER — OFFICE VISIT (OUTPATIENT)
Dept: FAMILY MEDICINE CLINIC | Age: 46
End: 2021-08-24
Payer: COMMERCIAL

## 2021-08-24 VITALS
WEIGHT: 190 LBS | BODY MASS INDEX: 30.53 KG/M2 | SYSTOLIC BLOOD PRESSURE: 122 MMHG | HEART RATE: 85 BPM | OXYGEN SATURATION: 99 % | RESPIRATION RATE: 20 BRPM | TEMPERATURE: 97.5 F | HEIGHT: 66 IN | DIASTOLIC BLOOD PRESSURE: 80 MMHG

## 2021-08-24 DIAGNOSIS — Z71.6 TOBACCO ABUSE COUNSELING: ICD-10-CM

## 2021-08-24 DIAGNOSIS — E66.09 CLASS 1 OBESITY DUE TO EXCESS CALORIES WITHOUT SERIOUS COMORBIDITY WITH BODY MASS INDEX (BMI) OF 30.0 TO 30.9 IN ADULT: Primary | ICD-10-CM

## 2021-08-24 DIAGNOSIS — Z12.31 BREAST CANCER SCREENING BY MAMMOGRAM: ICD-10-CM

## 2021-08-24 DIAGNOSIS — F17.200 TOBACCO DEPENDENCE: ICD-10-CM

## 2021-08-24 DIAGNOSIS — Z90.711 HISTORY OF PARTIAL HYSTERECTOMY: ICD-10-CM

## 2021-08-24 PROBLEM — E66.9 OBESITY (BMI 30-39.9): Status: ACTIVE | Noted: 2021-08-24

## 2021-08-24 PROCEDURE — 99203 OFFICE O/P NEW LOW 30 MIN: CPT | Performed by: FAMILY MEDICINE

## 2021-08-24 NOTE — PROGRESS NOTES
Chief Complaint   Patient presents with    New Patient    Establish Care     1. Have you been to the ER, urgent care clinic since your last visit? Hospitalized since your last visit? No    2. Have you seen or consulted any other health care providers outside of the [address] [place] [address] since your last visit? Include any pap smears or colon screening.  Yes Reason for visit: Podiatrist 2 weeks ago     Visit Vitals  /80 (BP 1 Location: Right upper arm, BP Patient Position: Sitting, BP Cuff Size: Adult)   Pulse 85   Temp 97.5 °F (36.4 °C) (Temporal)   Resp 20   Ht 5' 6\" (1.676 m)   Wt 190 lb (86.2 kg)   LMP 05/11/2020 (Approximate)   SpO2 99% Comment: room air   BMI 30.67 kg/m²

## 2021-08-24 NOTE — PROGRESS NOTES
HPI    Cecille Olson is a 48 y.o. female and presents today for New Patient and Establish Care  . HPI     47 Yo AAF with a hx of obesity here to establish care after 4 years with no PCP. Denies current symptoms or recent med problems with a hx of HTN,Diabetes and Obesity. Pt with 20 year pack hx of smoking states she is not ready to quit now. UTD on COVID 19 Vaccine    Allergies    No Known Allergies     Medications    No current outpatient medications on file. No current facility-administered medications for this visit.      Facility-Administered Medications Ordered in Other Visits   Medication Dose Route Frequency    0.9% sodium chloride infusion 250 mL  250 mL IntraVENous PRN        Health Maintenance    Health Maintenance Due   Topic Date Due    Hepatitis C Screening  Never done    Pneumococcal 0-64 years (1 of 2 - PPSV23) Never done    COVID-19 Vaccine (1) Never done    DTaP/Tdap/Td series (1 - Tdap) Never done    PAP AKA CERVICAL CYTOLOGY  Never done    Lipid Screen  Never done    Colorectal Cancer Screening Combo  Never done        Problem List    Patient Active Problem List    Diagnosis Date Noted    Obesity (BMI 30-39.9) 08/24/2021    Class 1 obesity due to excess calories without serious comorbidity with body mass index (BMI) of 30.0 to 30.9 in adult 08/24/2021    History of partial hysterectomy 08/24/2021    Status post laparoscopic hysterectomy 07/09/2020        Family Hx    Family History   Problem Relation Age of Onset    Cancer Mother         Social Hx    Social History     Socioeconomic History    Marital status: SINGLE     Spouse name: Not on file    Number of children: Not on file    Years of education: Not on file    Highest education level: Not on file   Tobacco Use    Smoking status: Current Every Day Smoker     Packs/day: 1.00     Years: 20.00     Pack years: 20.00    Smokeless tobacco: Never Used   Substance and Sexual Activity    Alcohol use: Not Currently    Drug use: Never     Social Determinants of Health     Financial Resource Strain:     Difficulty of Paying Living Expenses:    Food Insecurity:     Worried About Running Out of Food in the Last Year:     [address] in the Last Year:    Transportation Needs:     Lack of Transportation (Medical):  Lack of Transportation (Non-Medical):    Physical Activity:     Days of Exercise per Week:     Minutes of Exercise per Session:    Stress:     Feeling of Stress :    Social Connections:     Frequency of Communication with Friends and Family:     Frequency of Social Gatherings with Friends and Family:     Attends [employer] Services:     Active Member of Clubs or Organizations:     Attends Club or Organization Meetings:     Marital Status:         Surgical Hx    Past Surgical History:   Procedure Laterality Date    HX HYSTERECTOMY            Vitals    Visit Vitals  /80 (BP 1 Location: Right upper arm, BP Patient Position: Sitting, BP Cuff Size: Adult)   Pulse 85   Temp 97.5 °F (36.4 °C) (Temporal)   Resp 20   Ht 5' 6\" (1.676 m)   Wt 190 lb (86.2 kg)   LMP 05/11/2020 (Approximate)   SpO2 99% Comment: room air   BMI 30.67 kg/m²        ROS    Review of Systems   Constitutional: Negative. Negative for chills and fever. HENT: Negative. Negative for congestion, ear discharge, hearing loss, nosebleeds and tinnitus. Eyes: Negative. Negative for blurred vision, double vision, photophobia and pain. Respiratory: Negative. Negative for cough, hemoptysis and sputum production. Cardiovascular: Negative. Negative for chest pain and palpitations. Gastrointestinal: Negative. Negative for heartburn, nausea and vomiting. Genitourinary: Negative. Negative for dysuria, frequency and urgency. Musculoskeletal: Negative. Negative for back pain and myalgias. Skin: Negative. Neurological: Negative. Negative for dizziness, tingling, weakness and headaches. Endo/Heme/Allergies: Negative. Psychiatric/Behavioral: Negative. Negative for depression and suicidal ideas. The patient does not have insomnia. All other systems reviewed and are negative. Physical Exam      Physical Exam  Vitals and nursing note reviewed. Constitutional:       Appearance: Normal appearance. She is obese. HENT:      Head: Normocephalic and atraumatic. Right Ear: Tympanic membrane, ear canal and external ear normal.      Left Ear: Tympanic membrane, ear canal and external ear normal.      Nose: Nose normal.      Mouth/Throat:      Mouth: Mucous membranes are moist.      Pharynx: Oropharynx is clear. No oropharyngeal exudate or posterior oropharyngeal erythema. Eyes:      General: No scleral icterus. Right eye: No discharge. Left eye: No discharge. Extraocular Movements: Extraocular movements intact. Conjunctiva/sclera: Conjunctivae normal.      Pupils: Pupils are equal, round, and reactive to light. Neck:      Vascular: No carotid bruit. Cardiovascular:      Rate and Rhythm: Normal rate. Pulses: Normal pulses. Heart sounds: Normal heart sounds. No murmur heard. No gallop. Pulmonary:      Effort: Pulmonary effort is normal. No respiratory distress. Breath sounds: Normal breath sounds. No stridor. No wheezing, rhonchi or rales. Chest:      Chest wall: No tenderness. Abdominal:      General: Bowel sounds are normal. There is no distension. Palpations: Abdomen is soft. There is no mass. Tenderness: There is no abdominal tenderness. There is no right CVA tenderness, left CVA tenderness or rebound. Hernia: No hernia is present. Musculoskeletal:         General: No swelling, tenderness, deformity or signs of injury. Normal range of motion. Cervical back: Normal range of motion and neck supple. No rigidity. No muscular tenderness. Right lower leg: No edema. Left lower leg: No edema.    Lymphadenopathy:      Cervical: No cervical adenopathy. Skin:     General: Skin is warm. Capillary Refill: Capillary refill takes 2 to 3 seconds. Coloration: Skin is not jaundiced or pale. Findings: No bruising, erythema, lesion or rash. Neurological:      General: No focal deficit present. Mental Status: She is alert and oriented to person, place, and time. Cranial Nerves: No cranial nerve deficit. Sensory: No sensory deficit. Motor: No weakness. Coordination: Coordination normal.      Gait: Gait normal.      Deep Tendon Reflexes: Reflexes normal.   Psychiatric:         Mood and Affect: Mood normal.         Behavior: Behavior normal.         Thought Content: Thought content normal.         Judgment: Judgment normal.          Assessment/Plan    Diagnoses and all orders for this visit:    1. Class 1 obesity due to excess calories without serious comorbidity with body mass index (BMI) of 30.0 to 30.9 in adult    2. History of partial hysterectomy    3. Tobacco dependence    4. Tobacco abuse counseling         Health Maintenance Items reviewed with patient as noted.

## 2021-08-24 NOTE — PATIENT INSTRUCTIONS
5 to 10 minutes spent discussing the benefits of quitting smoking and counseling on the ills and drawbacks of continuing smoking       Learning About Benefits From Quitting Smoking  How does quitting smoking make you healthier? If you're thinking about quitting smoking, you may have a few reasons to be smoke-free. Your health may be one of them. · When you quit smoking, you lower your risks for cancer, lung disease, heart attack, stroke, blood vessel disease, and blindness from macular degeneration. · When you're smoke-free, you get sick less often, and you heal faster. You are less likely to get colds, flu, bronchitis, and pneumonia. · As a nonsmoker, you may find that your mood is better and you are less stressed. When and how will you feel healthier? Quitting has real health benefits that start from day 1 of being smoke-free. And the longer you stay smoke-free, the healthier you get and the better you feel. The first hours  · After just 20 minutes, your blood pressure and heart rate go down. That means there's less stress on your heart and blood vessels. · Within 12 hours, the level of carbon monoxide in your blood drops back to normal. That makes room for more oxygen. With more oxygen in your body, you may notice that you have more energy than when you smoked. After 2 weeks  · Your lungs start to work better. · Your risk of heart attack starts to drop. After 1 month  · When your lungs are clear, you cough less and breathe deeper, so it's easier to be active. · Your sense of taste and smell return. That means you can enjoy food more than you have since you started smoking. Over the years  · Over the years, your risks of heart disease, heart attack, and stroke are lower. · After 10 years, your risk of dying from lung cancer is cut by about half. And your risk for many other types of cancer is lower too. How would quitting help others in your life?   When you quit smoking, you improve the health of everyone who now breathes in your smoke. · Their heart, lung, and cancer risks drop, much like yours. · They are sick less. For babies and small children, living smoke-free means they're less likely to have ear infections, pneumonia, and bronchitis. · If you're a woman who is or will be pregnant someday, quitting smoking means a healthier . · Children who are close to you are less likely to become adult smokers. Where can you learn more? Go to http://www.gray.com/  Enter O319 in the search box to learn more about \"Learning About Benefits From Quitting Smoking. \"  Current as of: 2020               Content Version: 12.8   [employer] Care instructions adapted under license by [employer] (which disclaims liability or warranty for this information). If you have questions about a medical condition or this instruction, always ask your healthcare professional. [place] [address] any warranty or liability for your use of this information. Learning About Low-Fat Eating  What is low-fat eating? Most food has some fat in it. Your body needs some fat to be healthy. But some kinds of fats are healthier than others. In a low-fat eating plan, you try to choose healthier fats and eat fewer unhealthy fats. Healthy fats include olive and canola oil. Try to avoid eating too much saturated fat, such as in cheese and meats. You do not need to cut all fat from your diet. But you can make healthier choices about the types and amount of fat you eat. Even though it is a good idea to choose healthier fats, it is still important to be careful of how much fat you eat, because all fats are high in calories. What are the different types of fats? Unhealthy fat  · Saturated fat. Saturated fats are mostly in animal foods, such as meat and dairy foods.  Tropical oils, such as coconut oil, palm oil, and cocoa butter, are also saturated fats. Healthy fats  · Monounsaturated fat. Monounsaturated fats are liquid at room temperature but get solid when refrigerated. Eating foods that are high in this fat may help lower your \"bad\" (LDL) cholesterol, keep your \"good\" (HDL) cholesterol level up, and lower your chances of getting coronary artery disease. This fat is found in canola oil, olive oil, peanut oil, olives, avocados, nuts, and nut butters. · Polyunsaturated fat. Polyunsaturated fats are liquid at room temperature. They are in safflower, sunflower, and corn oils. They are also the main fat in seafood. Omega-3 fatty acids are types of polyunsaturated fat. Eating fish may lower your chances of getting coronary artery disease. Fatty fish such as salmon and mackerel contain these healthy fatty acids. So do ground flaxseeds and flaxseed oil, soybeans, walnuts, and seeds. Why cut down on unhealthy fats? Eating foods that contain saturated fats can raise the LDL (\"bad\") cholesterol in your blood. Having a high level of LDL cholesterol increases your chance of hardening of the arteries (atherosclerosis), which can lead to heart disease, heart attack, and stroke. In general:  · No more than 10% of your daily calories should come from saturated fat. This is about 20 grams in a 2,000-calorie diet. · No more than 10% of your daily calories should come from polyunsaturated fat. This is about 20 grams in a 2,000-calorie diet. · Monounsaturated fats can be up to 15% of your daily calories. This is about 25 to 30 grams in a 2,000-calorie diet. If you're not sure how much fat you should be eating or how many calories you need each day to stay at a healthy weight, talk to a registered dietitian. A dietitian can help you create a plan that's right for you. What can you do to cut down on fat? Foods like cheese, butter, sausage, and desserts can have a lot of unhealthy fats. Try these tips for healthier meals at home and when you eat out.   At home  · Fill up on fruits, vegetables, and whole grains. · Think of meat as a side dish instead of as the main part of your meal.  · When you do eat meat, make it extra-lean ground beef (97% lean), ground turkey breast (without skin added), meats with fat trimmed off before cooking, or skinless chicken. · Try main dishes that use whole wheat pasta, brown rice, dried beans, or vegetables. · Use cooking methods that use little or no fat, such as broiling, steaming, or grilling. Use cooking spray instead of oil. If you use oil, use a monounsaturated oil, such as canola or olive oil. · Read food labels on canned, bottled, or packaged foods. Choose those with little saturated fat. When eating out at a restaurant  · Order foods that are broiled or poached instead of fried or breaded. · Cut back on the amount of butter or margarine that you use on bread. Use small amounts of olive oil instead. · Order sauces, gravies, and salad dressings on the side, and use only a little. · When you order pasta, choose tomato sauce instead of cream sauce. · Ask for salsa with your baked potato instead of sour cream, butter, cheese, or elliott. Where can you learn more? Go to http://www.gray.com/  Enter [id number] in the search box to learn more about \"Learning About Low-Fat Eating. \"  Current as of: December 17, 2020               Content Version: 12.8  © 2006-2021 Healthwise, [facility]. Care instructions adapted under license by [employer] (which disclaims liability or warranty for this information). If you have questions about a medical condition or this instruction, always ask your healthcare professional. [place] [address] any warranty or liability for your use of this information. Low Sodium Diet (2,000 Milligram): Care Instructions  Overview     Limiting sodium can be an important part of managing some health problems. The most common source of sodium is salt. People get most of the salt in their diet from canned, prepared, and packaged foods. Fast food and restaurant meals also are very high in sodium. Your doctor will probably limit your sodium to less than 2,000 milligrams (mg) a day. This limit counts all the sodium in prepared and packaged foods and any salt you add to your food. Follow-up care is a key part of your treatment and safety. Be sure to make and go to all appointments, and call your doctor if you are having problems. It's also a good idea to know your test results and keep a list of the medicines you take. How can you care for yourself at home? Read food labels  · Read labels on cans and food packages. The labels tell you how much sodium is in each serving. Make sure that you look at the serving size. If you eat more than the serving size, you have eaten more sodium. · Food labels also tell you the Percent Daily Value for sodium. Choose products with low Percent Daily Values for sodium. · Be aware that sodium can come in forms other than salt, including monosodium glutamate (MSG), sodium citrate, and sodium bicarbonate (baking soda). MSG is often added to Asian food. When you eat out, you can sometimes ask for food without MSG or added salt. Buy low-sodium foods  · Buy foods that are labeled \"unsalted\" (no salt added), \"sodium-free\" (less than 5 mg of sodium per serving), or \"low-sodium\" (140 mg or less of sodium per serving). Foods labeled \"reduced-sodium\" and \"light sodium\" may still have too much sodium. Be sure to read the label to see how much sodium you are getting. · Buy fresh vegetables, or frozen vegetables without added sauces. Buy low-sodium versions of canned vegetables, soups, and other canned goods. Prepare low-sodium meals  · Cut back on the amount of salt you use in cooking. This will help you adjust to the taste. Do not add salt after cooking. One teaspoon of salt has about 2,300 mg of sodium.   · Take the salt shaker off the table.  · Flavor your food with garlic, lemon juice, onion, vinegar, herbs, and spices. Do not use soy sauce, lite soy sauce, steak sauce, onion salt, garlic salt, celery salt, or ketchup on your food. · Use low-sodium salad dressings, sauces, and ketchup. Or make your own salad dressings and sauces without adding salt. · Use less salt (or none) when recipes call for it. You can often use half the salt a recipe calls for without losing flavor. Other foods such as rice, pasta, and grains do not need added salt. · Rinse canned vegetables, and cook them in fresh water. This removes somebut not allof the salt. · Avoid water that is naturally high in sodium or that has been treated with water softeners, which add sodium. If you buy bottled water, read the label and choose a sodium-free brand. Avoid high-sodium foods  · Avoid eating:  ? Smoked, cured, salted, and canned meat, fish, and poultry. ? Ham, elliott, hot dogs, and luncheon meats. ? Regular, hard, and processed cheese and regular peanut butter. ? Crackers with salted tops, and other salted snack foods such as pretzels, chips, and salted popcorn. ? Frozen prepared meals, unless labeled low-sodium. ? Canned and dried soups, broths, and bouillon, unless labeled sodium-free or low-sodium. ? Canned vegetables, unless labeled sodium-free or low-sodium. ? [employer] fries, pizza, tacos, and other fast foods. ? Pickles, olives, ketchup, and other condiments, especially soy sauce, unless labeled sodium-free or low-sodium. Where can you learn more? Go to http://www.gray.com/  Enter V843 in the search box to learn more about \"Low Sodium Diet (2,000 Milligram): Care Instructions. \"  Current as of: December 17, 2020               Content Version: 12.8  © 3141-6060 [employer]. Care instructions adapted under license by [employer] (which disclaims liability or warranty for this information).  If you have questions about a medical condition or this instruction, always ask your healthcare professional. [place] [address] any warranty or liability for your use of this information.

## 2021-11-01 ENCOUNTER — NURSE TRIAGE (OUTPATIENT)
Dept: OTHER | Facility: CLINIC | Age: 46
End: 2021-11-01

## 2021-11-01 NOTE — TELEPHONE ENCOUNTER
Received call from Mario at [facility] with Red Flag Complaint. Brief description of triage: Patient reports 3 day history of pain and swelling to left foot. Reports being seen at an Urgent Care on Saturday for same issue and symptoms are not worse. Triage indicates for patient to be seen in the office within 3 days. Triage RN advised patient that if she cannot be seen in the office within 3 days to go to an [facility] [address] Care advice provided, patient verbalizes understanding; denies any other questions or concerns; instructed to call back for any new or worsening symptoms. Writer provided warm transfer to [facility] at [facility] for appointment scheduling. Attention Provider: Thank you for allowing me to participate in the care of your patient. The patient was connected to triage in response to information provided to the [facility]. Please do not respond through this encounter as the response is not directed to a shared pool. Reason for Disposition   MODERATE pain (e.g., interferes with normal activities, limping) and present > 3 days    Answer Assessment - Initial Assessment Questions  1. ONSET: \"When did the pain start? \"       3 days  Reports being seen in an Urgent Care on Saturday for the same issue, pain and swelling are not worse since Urgent Care evaluation. 2. LOCATION: \"Where is the pain located? \"       Left foot    3. PAIN: \"How bad is the pain? \"    (Scale 1-10; or mild, moderate, severe)    -  MILD (1-3): doesn't interfere with normal activities     -  MODERATE (4-7): interferes with normal activities (e.g., work or school) or awakens from sleep, limping     -  SEVERE (8-10): excruciating pain, unable to do any normal activities, unable to walk      9/10, same pain rating as when was seen at Urgent Care on Saturday    4. WORK OR EXERCISE: \"Has there been any recent work or exercise that involved this part of the body? \"       Denies    5.  CAUSE: \"What do you think is causing the foot pain?\"      Unknown    6. OTHER SYMPTOMS: \"Do you have any other symptoms? \" (e.g., leg pain, rash, fever, numbness)     Foot feels numb/pins and needles    Protocols used: FOOT PAIN-ADULT-[place]

## 2021-11-03 ENCOUNTER — OFFICE VISIT (OUTPATIENT)
Dept: FAMILY MEDICINE CLINIC | Age: 46
End: 2021-11-03
Payer: COMMERCIAL

## 2021-11-03 VITALS
TEMPERATURE: 97.3 F | SYSTOLIC BLOOD PRESSURE: 138 MMHG | WEIGHT: 193.6 LBS | HEART RATE: 68 BPM | HEIGHT: 66 IN | BODY MASS INDEX: 31.12 KG/M2 | OXYGEN SATURATION: 98 % | DIASTOLIC BLOOD PRESSURE: 78 MMHG

## 2021-11-03 DIAGNOSIS — M10.9 GOUTY ARTHROPATHY: ICD-10-CM

## 2021-11-03 DIAGNOSIS — R60.0 EDEMA OF LEFT LOWER EXTREMITY: Primary | ICD-10-CM

## 2021-11-03 DIAGNOSIS — M79.672 LEFT FOOT PAIN: ICD-10-CM

## 2021-11-03 DIAGNOSIS — E66.9 OBESITY (BMI 30-39.9): ICD-10-CM

## 2021-11-03 PROCEDURE — 99213 OFFICE O/P EST LOW 20 MIN: CPT | Performed by: FAMILY MEDICINE

## 2021-11-03 RX ORDER — METHYLPREDNISOLONE 4 MG/1
TABLET ORAL
COMMUNITY
Start: 2021-10-30

## 2021-11-03 RX ORDER — INDOMETHACIN 50 MG/1
50 CAPSULE ORAL
Qty: 21 CAPSULE | Refills: 0 | Status: SHIPPED | OUTPATIENT
Start: 2021-11-03 | End: 2021-11-10

## 2021-11-03 NOTE — PROGRESS NOTES
HPI    Reece Ritchie is a 48 y.o. female and presents today for Foot Swelling (woke up Saturday with swelling in left foot. Went to [facility]. Wants to know what is causing the swelling.)  . HPI     49 yo AAF with a hx of obesity c/o left lower extremity pain x 4 days and swelling x 2 days that is worsening. States last occurrence was 2 months ago  Allergies    No Known Allergies     Medications    Current Outpatient Medications   Medication Sig Dispense    methylPREDNISolone (MEDROL DOSEPACK) 4 mg tablet       No current facility-administered medications for this visit.      Facility-Administered Medications Ordered in Other Visits   Medication Dose Route Frequency    0.9% sodium chloride infusion 250 mL  250 mL IntraVENous PRN        Health Maintenance    Health Maintenance Due   Topic Date Due    Hepatitis C Screening  Never done    Pneumococcal 0-64 years (1 of 2 - PPSV23) Never done    DTaP/Tdap/Td series (1 - Tdap) Never done    Lipid Screen  Never done    Colorectal Cancer Screening Combo  Never done    Flu Vaccine (1) Never done        Problem List    Patient Active Problem List    Diagnosis Date Noted    Edema of left lower extremity 11/03/2021    Left foot pain 11/03/2021    Obesity (BMI 30-39.9) 08/24/2021    Class 1 obesity due to excess calories without serious comorbidity with body mass index (BMI) of 30.0 to 30.9 in adult 08/24/2021    History of partial hysterectomy 08/24/2021    Tobacco dependence 08/24/2021    Tobacco abuse counseling 08/24/2021    Status post laparoscopic hysterectomy 07/09/2020        Family Hx    Family History   Problem Relation Age of Onset    Cancer Mother         Social Hx    Social History     Socioeconomic History    Marital status: SINGLE   Tobacco Use    Smoking status: Current Every Day Smoker     Packs/day: 1.00     Years: 20.00     Pack years: 20.00    Smokeless tobacco: Never Used   Substance and Sexual Activity    Alcohol use: Not Currently    Drug use: Never        Surgical Hx    Past Surgical History:   Procedure Laterality Date    HX HYSTERECTOMY            Vitals    Visit Vitals  /78 (BP 1 Location: Right upper arm)   Pulse 68   Temp 97.3 °F (36.3 °C) (Temporal)   Ht 5' 6\" (1.676 m)   Wt 193 lb 9.6 oz (87.8 kg)   LMP 05/11/2020 (Approximate)   SpO2 98%   BMI 31.25 kg/m²        ROS    Review of Systems   Constitutional: Negative. Negative for chills and fever. HENT: Negative. Negative for congestion, ear discharge, hearing loss, nosebleeds and tinnitus. Eyes: Negative. Negative for blurred vision, double vision, photophobia and pain. Respiratory: Negative. Negative for cough, hemoptysis and sputum production. Cardiovascular: Negative. Negative for chest pain and palpitations. Gastrointestinal: Negative. Negative for heartburn, nausea and vomiting. Genitourinary: Negative. Negative for dysuria, frequency and urgency. Musculoskeletal: Negative. Negative for back pain and myalgias. Skin: Negative. Neurological: Negative. Negative for dizziness, tingling, weakness and headaches. Endo/Heme/Allergies: Negative. Psychiatric/Behavioral: Negative. Negative for depression and suicidal ideas. The patient does not have insomnia. All other systems reviewed and are negative. Physical Exam      Physical Exam  Vitals and nursing note reviewed. Constitutional:       Appearance: Normal appearance. She is obese. HENT:      Head: Normocephalic and atraumatic. Right Ear: Tympanic membrane, ear canal and external ear normal.      Left Ear: Tympanic membrane, ear canal and external ear normal.      Nose: Nose normal.      Mouth/Throat:      Mouth: Mucous membranes are moist.      Pharynx: Oropharynx is clear. No oropharyngeal exudate or posterior oropharyngeal erythema. Eyes:      General: No scleral icterus. Right eye: No discharge. Left eye: No discharge.       Extraocular Movements: Extraocular movements intact. Conjunctiva/sclera: Conjunctivae normal.      Pupils: Pupils are equal, round, and reactive to light. Neck:      Vascular: No carotid bruit. Cardiovascular:      Rate and Rhythm: Normal rate. Pulses: Normal pulses. Heart sounds: Normal heart sounds. No murmur heard. No gallop. Pulmonary:      Effort: Pulmonary effort is normal. No respiratory distress. Breath sounds: Normal breath sounds. No stridor. No wheezing, rhonchi or rales. Chest:      Chest wall: No tenderness. Abdominal:      General: Bowel sounds are normal. There is no distension. Palpations: Abdomen is soft. There is no mass. Tenderness: There is no abdominal tenderness. There is no right CVA tenderness, left CVA tenderness or rebound. Hernia: No hernia is present. Musculoskeletal:         General: No swelling (left anterior foot with tenderness), tenderness, deformity or signs of injury. Normal range of motion. Cervical back: Normal range of motion and neck supple. No rigidity. No muscular tenderness. Right lower leg: No edema. Left lower leg: No edema. Lymphadenopathy:      Cervical: No cervical adenopathy. Skin:     General: Skin is warm. Capillary Refill: Capillary refill takes 2 to 3 seconds. Coloration: Skin is not jaundiced or pale. Findings: No bruising, erythema, lesion or rash. Neurological:      General: No focal deficit present. Mental Status: She is alert and oriented to person, place, and time. Cranial Nerves: No cranial nerve deficit. Sensory: No sensory deficit. Motor: No weakness. Coordination: Coordination normal.      Gait: Gait normal.      Deep Tendon Reflexes: Reflexes normal.   Psychiatric:         Mood and Affect: Mood normal.         Behavior: Behavior normal.         Thought Content:  Thought content normal.         Judgment: Judgment normal.          Assessment/Plan    Diagnoses and all orders for this visit:    1. Edema of left lower extremity    2. Left foot pain    3. Obesity (BMI 30-39. 9)         Health Maintenance Items reviewed with patient as noted.

## 2021-11-03 NOTE — PROGRESS NOTES
1. Have you been to the ER, urgent care clinic since your last visit? Hospitalized since your last visit?see note below    2. Have you seen or consulted any other health care providers outside of the [address] [place] [address] since your last visit? Include any pap smears or colon screening. No    Chief Complaint   Patient presents with    Foot Swelling     woke up Saturday with swelling in left foot. Went to [facility]. Wants to know what is causing the swelling.      Visit Vitals  /78 (BP 1 Location: Right upper arm)   Pulse 68   Temp 97.3 °F (36.3 °C) (Temporal)   Ht 5' 6\" (1.676 m)   Wt 193 lb 9.6 oz (87.8 kg)   LMP 05/11/2020 (Approximate)   SpO2 98%   BMI 31.25 kg/m²

## 2022-03-18 PROBLEM — E66.09 CLASS 1 OBESITY DUE TO EXCESS CALORIES WITHOUT SERIOUS COMORBIDITY WITH BODY MASS INDEX (BMI) OF 30.0 TO 30.9 IN ADULT: Status: ACTIVE | Noted: 2021-08-24

## 2022-03-18 PROBLEM — Z71.6 TOBACCO ABUSE COUNSELING: Status: ACTIVE | Noted: 2021-08-24

## 2022-03-19 PROBLEM — Z90.710 STATUS POST LAPAROSCOPIC HYSTERECTOMY: Status: ACTIVE | Noted: 2020-07-09

## 2022-03-19 PROBLEM — M10.9 GOUTY ARTHROPATHY: Status: ACTIVE | Noted: 2021-11-03

## 2022-03-19 PROBLEM — R60.0 EDEMA OF LEFT LOWER EXTREMITY: Status: ACTIVE | Noted: 2021-11-03

## 2022-03-19 PROBLEM — E66.9 OBESITY (BMI 30-39.9): Status: ACTIVE | Noted: 2021-08-24

## 2022-03-20 PROBLEM — M79.672 LEFT FOOT PAIN: Status: ACTIVE | Noted: 2021-11-03

## 2022-03-20 PROBLEM — Z90.711 HISTORY OF PARTIAL HYSTERECTOMY: Status: ACTIVE | Noted: 2021-08-24

## 2022-03-20 PROBLEM — F17.200 TOBACCO DEPENDENCE: Status: ACTIVE | Noted: 2021-08-24

## 2022-05-25 ENCOUNTER — TELEPHONE (OUTPATIENT)
Dept: FAMILY MEDICINE CLINIC | Age: 47
End: 2022-05-25

## 2022-05-25 NOTE — TELEPHONE ENCOUNTER
Dr. Raquel Glaser,    [address] [place] [address] pharmacy on [facility] in [place], [place] needs you to send over a new prescription for True Metrix Glucose Test Strips and also for the True Metrix Glucose Lancets for the patient to test 2 x aweek and put the diagnosis on the order. I tried to do a verbal but it wouldn't go through due to her insurance.

## 2024-05-15 ENCOUNTER — TELEPHONE (OUTPATIENT)
Facility: CLINIC | Age: 49
End: 2024-05-15

## (undated) DEVICE — SUTURE VCRL SZ 0 L18IN ABSRB VLT L40MM CT 1/2 CIR J752D

## (undated) DEVICE — SUTURE SZ 0 27IN 5/8 CIR UR-6  TAPER PT VIOLET ABSRB VICRYL J603H

## (undated) DEVICE — YANKAUER,BULB TIP,W/O VENT,RIGID,STERILE: Brand: MEDLINE

## (undated) DEVICE — BARRIER TISS ADH ABSRB 3X4IN -- GYNECARE INTERCEED

## (undated) DEVICE — MARYLAND JAW LAPAROSCOPIC SEALER/DIVIDER COATED: Brand: LIGASURE

## (undated) DEVICE — COVER LT HNDL PLAS RIG 1 PER PK

## (undated) DEVICE — ROCKER SWITCH PENCIL BLADE ELECTRODE, HOLSTER: Brand: EDGE

## (undated) DEVICE — SUTURE VCRL SZ 0 L27IN ABSRB UD L36MM CT-1 1/2 CIR J260H

## (undated) DEVICE — SURGICAL PROCEDURE PACK GYN LAPAROSCOPY CUST SMH LF

## (undated) DEVICE — CLICKLINE SCISSORS INSERT: Brand: CLICKLINE

## (undated) DEVICE — SHEARS ENDOSCP L36CM DIA5MM ULTRASONIC CRV TIP W/ ADV

## (undated) DEVICE — TUBING, SUCTION, 1/4" X 10', STRAIGHT: Brand: MEDLINE

## (undated) DEVICE — Device

## (undated) DEVICE — PAD,SANITARY,11 IN,MAXI,N-STRL,IND WRAP: Brand: MEDLINE

## (undated) DEVICE — INSUFFLATION NEEDLE: Brand: SURGINEEDLE

## (undated) DEVICE — TROCAR: Brand: KII® SLEEVE

## (undated) DEVICE — DERMABOND SKIN ADH 0.7ML -- DERMABOND ADVANCED 12/BX

## (undated) DEVICE — SUTURE MCRYL SZ 0 L36IN ABSRB UD L36MM CT-1 1/2 CIR Y946H

## (undated) DEVICE — CANISTER, RIGID, 3000CC: Brand: MEDLINE INDUSTRIES, INC.

## (undated) DEVICE — VCARE MEDIUM, UTERINE MANIPULATOR, VAGINAL-CERVICAL-AHLUWALIA'S-RETRACTOR-ELEVATOR: Brand: VCARE

## (undated) DEVICE — TRAY PREP DRY W/ PREM GLV 2 APPL 6 SPNG 2 UNDPD 1 OVERWRAP

## (undated) DEVICE — INSTRMT SET WND CLSR SUT PASS --

## (undated) DEVICE — STRAP,POSITIONING,KNEE/BODY,FOAM,4X60": Brand: MEDLINE

## (undated) DEVICE — INFECTION CONTROL KIT SYS

## (undated) DEVICE — LAPAROSCOPIC WIRE L-HOOK ELECTRODE COATED: Brand: CLEANCOAT

## (undated) DEVICE — GOWN,SIRUS,NONRNF,SETINSLV,XL,20/CS: Brand: MEDLINE

## (undated) DEVICE — TUBING FLTR PLUME AWAY EVAC W/ SUCT DEV DISP PUREVIEW

## (undated) DEVICE — LAPAROSCOPIC TROCAR SLEEVE/SINGLE USE: Brand: KII® OPTICAL ACCESS SYSTEM

## (undated) DEVICE — TOTAL TRAY, 16FR 10ML SIL FOLEY, URN: Brand: MEDLINE

## (undated) DEVICE — NEEDLE HYPO 22GA L1.5IN BLK S STL HUB POLYPR SHLD REG BVL

## (undated) DEVICE — STERILE POLYISOPRENE POWDER-FREE SURGICAL GLOVES: Brand: PROTEXIS

## (undated) DEVICE — STERILE POLYISOPRENE POWDER-FREE SURGICAL GLOVES WITH EMOLLIENT COATING: Brand: PROTEXIS

## (undated) DEVICE — REM POLYHESIVE ADULT PATIENT RETURN ELECTRODE: Brand: VALLEYLAB

## (undated) DEVICE — PREP SKN CHLRAPRP APL 26ML STR --

## (undated) DEVICE — Z INACTIVE USE 2527070 DRAPE SURG W40XL44IN UNDERBUTTOCK SMS POLYPR W/ PCH BK DISP

## (undated) DEVICE — DRAPE,REIN 53X77,STERILE: Brand: MEDLINE

## (undated) DEVICE — SOLUTION IV 1000ML 0.9% SOD CHL

## (undated) DEVICE — SYRINGE,EAR/ULCER, 2 OZ, STERILE: Brand: MEDLINE

## (undated) DEVICE — SUTURE MCRYL SZ 4-0 L27IN ABSRB UD L19MM PS-2 1/2 CIR PRIM Y426H